# Patient Record
Sex: FEMALE | Race: BLACK OR AFRICAN AMERICAN | NOT HISPANIC OR LATINO | Employment: UNEMPLOYED | URBAN - METROPOLITAN AREA
[De-identification: names, ages, dates, MRNs, and addresses within clinical notes are randomized per-mention and may not be internally consistent; named-entity substitution may affect disease eponyms.]

---

## 2018-08-03 ENCOUNTER — PATIENT OUTREACH (OUTPATIENT)
Dept: FAMILY MEDICINE CLINIC | Facility: CLINIC | Age: 23
End: 2018-08-03

## 2018-08-03 NOTE — PROGRESS NOTES
Pt called LSW to request a resource for   Pt self reports a previous diagnosis of PTSD, depression, and anxiety  Pt states she has no current provider, and is not currently taking any psych meds  Pt denies SI/HI during this encounter  As per pt, she currently has [employer], and has been unable to find a provider  LSW educated pt that it is a difficult insurance to place for [address] in this area, but LSW will try to find her an appt  LSW called Danielle Barlow at [facility], [phone], ext  [phone]  [facility] will accept the insurance, but there will be a waiting list for this insurance  Tori to contact pt with any additional information needed and update LSW when an appt becomes available  LSW is available for additional support as needed via consult

## 2019-01-25 ENCOUNTER — TELEPHONE (OUTPATIENT)
Dept: OBGYN CLINIC | Facility: CLINIC | Age: 24
End: 2019-01-25

## 2019-01-29 ENCOUNTER — OFFICE VISIT (OUTPATIENT)
Dept: OBGYN CLINIC | Facility: CLINIC | Age: 24
End: 2019-01-29

## 2019-01-29 VITALS
HEIGHT: 65 IN | DIASTOLIC BLOOD PRESSURE: 60 MMHG | BODY MASS INDEX: 26.82 KG/M2 | WEIGHT: 161 LBS | SYSTOLIC BLOOD PRESSURE: 110 MMHG

## 2019-01-29 DIAGNOSIS — Z01.419 ENCOUNTER FOR GYNECOLOGICAL EXAMINATION (GENERAL) (ROUTINE) WITHOUT ABNORMAL FINDINGS: Primary | ICD-10-CM

## 2019-01-29 DIAGNOSIS — Z12.4 SCREENING FOR CERVICAL CANCER: ICD-10-CM

## 2019-01-29 DIAGNOSIS — Z11.3 SCREENING EXAMINATION FOR SEXUALLY TRANSMITTED DISEASE: ICD-10-CM

## 2019-01-29 DIAGNOSIS — Z30.011 ENCOUNTER FOR INITIAL PRESCRIPTION OF CONTRACEPTIVE PILLS: ICD-10-CM

## 2019-01-29 PROCEDURE — G0145 SCR C/V CYTO,THINLAYER,RESCR: HCPCS | Performed by: OBSTETRICS & GYNECOLOGY

## 2019-01-29 PROCEDURE — G0101 CA SCREEN;PELVIC/BREAST EXAM: HCPCS | Performed by: OBSTETRICS & GYNECOLOGY

## 2019-01-29 PROCEDURE — 87491 CHLMYD TRACH DNA AMP PROBE: CPT | Performed by: OBSTETRICS & GYNECOLOGY

## 2019-01-29 PROCEDURE — 87591 N.GONORRHOEAE DNA AMP PROB: CPT | Performed by: OBSTETRICS & GYNECOLOGY

## 2019-01-29 RX ORDER — LEVONORGESTREL AND ETHINYL ESTRADIOL 0.1-0.02MG
1 KIT ORAL DAILY
Qty: 28 TABLET | Refills: 11 | Status: SHIPPED | OUTPATIENT
Start: 2019-01-29 | End: 2021-05-26 | Stop reason: SDUPTHER

## 2019-01-29 NOTE — PROGRESS NOTES
Assessment/Plan:           Diagnoses and all orders for this visit:    Encounter for gynecological examination (general) (routine) without abnormal findings    Screening for cervical cancer  -     Liquid-based pap, screening; Future  -     Liquid-based pap, screening    Screening examination for sexually transmitted disease  -     Cancel: Chlamydia/GC amplified DNA by PCR; Future  -     Chlamydia/GC amplified DNA by PCR    Encounter for initial prescription of contraceptive pills  -     levonorgestrel-ethinyl estradiol (AVIANE,ALESSE,LESSINA) 0 1-20 MG-MCG per tablet; Take 1 tablet by mouth daily              Subjective:      Patient ID: Hiwot Palma is a [de-identified] P0  26 y o  female who is presenting to her obgyn office for her annual exam and pap smear  She did have a yearly exam last year, but did not have a pap smear due to hesitation about the speculum because of virginity and vaginismus  She has since become sexually active and says her only complaint today is some mild vaginal irritation when she inserts her tampon and on insertion during sexual intercourse  After insertion, she experiences no discomfort  She is not currently using any contraception for sex with her one male partner  She is currently using condoms  She is interested in starting OCPs  Her FDLMP was 1/20/19  Her periods are regular and usually last 5 days  She was on OCPs in the past for heavy and painful periods but stopped taking them about a year ago  She has no breast complaints today  She has no hx of STDs and no hx of migraines  HPI    The following portions of the patient's history were reviewed and updated as appropriate: allergies, current medications, past family history, past medical history, past social history, past surgical history and problem list     Review of Systems   Genitourinary: Positive for vaginal pain (irritation when inserting a tampon or penis )   Negative for dyspareunia, hematuria, pelvic pain, vaginal bleeding and vaginal discharge  Objective:      /60   Ht 5' 5" (1 651 m)   Wt 73 kg (161 lb)   LMP 01/17/2019 (Exact Date)   BMI 26 79 kg/m²          Physical Exam   Constitutional: She is oriented to person, place, and time  She appears well-developed and well-nourished  No distress  HENT:   Head: Normocephalic  Neck: No thyromegaly present  Cardiovascular: Normal rate, regular rhythm and normal heart sounds  No murmur heard  Pulmonary/Chest: Effort normal and breath sounds normal  No respiratory distress  She has no wheezes  She has no rales  She exhibits no tenderness  Right breast exhibits no inverted nipple, no mass, no nipple discharge, no skin change and no tenderness  Left breast exhibits no inverted nipple, no mass, no nipple discharge, no skin change and no tenderness  Abdominal: Soft  Bowel sounds are normal  She exhibits no distension and no mass  There is no tenderness  There is no rebound and no guarding  Genitourinary: Uterus normal  Rectal exam shows no external hemorrhoid  No breast swelling, tenderness, discharge or bleeding  No labial fusion  There is no rash, tenderness, lesion or injury on the right labia  There is no rash, tenderness, lesion or injury on the left labia  Uterus is not deviated, not enlarged and not tender  Cervix exhibits no motion tenderness, no discharge and no friability  Right adnexum displays no mass, no tenderness and no fullness  Left adnexum displays no mass, no tenderness and no fullness  No erythema or tenderness in the vagina  No signs of injury around the vagina  No vaginal discharge found  Musculoskeletal: She exhibits no edema  Lymphadenopathy:        Right: No inguinal adenopathy present  Left: No inguinal adenopathy present  Neurological: She is alert and oriented to person, place, and time  Skin: Skin is warm and dry  Psychiatric: She has a normal mood and affect   Her behavior is normal  Judgment and thought content normal  Nursing note and vitals reviewed

## 2019-01-30 LAB
C TRACH DNA SPEC QL NAA+PROBE: NEGATIVE
N GONORRHOEA DNA SPEC QL NAA+PROBE: NEGATIVE

## 2019-02-01 LAB
LAB AP GYN PRIMARY INTERPRETATION: NORMAL
LAB AP LMP: NORMAL
Lab: NORMAL

## 2020-01-13 ENCOUNTER — TELEPHONE (OUTPATIENT)
Dept: OBGYN CLINIC | Facility: CLINIC | Age: 25
End: 2020-01-13

## 2020-02-02 ENCOUNTER — HOSPITAL ENCOUNTER (EMERGENCY)
Facility: HOSPITAL | Age: 25
Discharge: HOME | End: 2020-02-02
Attending: EMERGENCY MEDICINE
Payer: MEDICARE

## 2020-02-02 VITALS
OXYGEN SATURATION: 100 % | DIASTOLIC BLOOD PRESSURE: 75 MMHG | HEIGHT: 65 IN | RESPIRATION RATE: 18 BRPM | TEMPERATURE: 98.2 F | SYSTOLIC BLOOD PRESSURE: 138 MMHG | BODY MASS INDEX: 25.99 KG/M2 | HEART RATE: 96 BPM | WEIGHT: 156 LBS

## 2020-02-02 DIAGNOSIS — J06.9 VIRAL URI WITH COUGH: Primary | ICD-10-CM

## 2020-02-02 PROCEDURE — 99283 EMERGENCY DEPT VISIT LOW MDM: CPT | Performed by: EMERGENCY MEDICINE

## 2020-02-02 PROCEDURE — 99282 EMERGENCY DEPT VISIT SF MDM: CPT

## 2020-02-02 NOTE — ED ATTENDING ATTESTATION
2/2/2020  I, Bev Blair MD, saw and evaluated the patient  I have discussed the patient with the resident/non-physician practitioner and agree with the resident's/non-physician practitioner's findings, Plan of Care, and MDM as documented in the resident's/non-physician practitioner's note, except where noted  All available labs and Radiology studies were reviewed  I was present for key portions of any procedure(s) performed by the resident/non-physician practitioner and I was immediately available to provide assistance  At this point I agree with the current assessment done in the Emergency Department  I have conducted an independent evaluation of this patient a history and physical is as follows:    ED Course      Emergency Department Note- Sophie Mcallister 22 y o  female MRN: [id number]    Unit/Bed#: QCB Encounter: [id number]    Sophie Mcallister is a 22 y o  female who presents with   Chief Complaint   Patient presents with    Sore Throat     sore throat, congestion, a lot of mucous and runny nose, started about 3-4 days ago         History of Present Illness   HPI:  Sophie Mcallister is a 22 y o  female who presents for evaluation of:  Sore throat, congestion, rhinorrhea for 3 days  Patient denies sick contacts at home  Review of Systems   Constitutional: Negative for chills and fever  Gastrointestinal: Negative for nausea and vomiting  Genitourinary: Negative for dysuria and flank pain  All other systems reviewed and are negative  Patient's last menstrual period was 01/26/2020  Historical Information   History reviewed  No pertinent past medical history  History reviewed  No pertinent surgical history    Social History   Social History     Substance and Sexual Activity   Alcohol Use No     Social History     Substance and Sexual Activity   Drug Use No     Social History     Tobacco Use   Smoking Status Never Smoker   Smokeless Tobacco Never Used     Family History: non-contributory    Meds/Allergies   all medications and allergies reviewed  No Known Allergies    Objective   First Vitals:   Blood Pressure: 138/75 (20)  Pulse: 96 (20)  Temperature: 98 2 °F (36 8 °C) (20)  Temp Source: Oral (20)  Respirations: 18 (20)  Height: 5' 5" (165 1 cm) (20)  Weight - Scale: 70 8 kg (156 lb) (20)  SpO2: 100 % (20)    Current Vitals:   Blood Pressure: 138/75 (20)  Pulse: 96 (20)  Temperature: 98 2 °F (36 8 °C) (20)  Temp Source: Oral (20)  Respirations: 18 (20)  Height: 5' 5" (165 1 cm) (20)  Weight - Scale: 70 8 kg (156 lb) (20)  SpO2: 100 % (20)    No intake or output data in the 24 hours ending 20    Invasive Devices     None                 Physical Exam   Constitutional: She is oriented to person, place, and time  She appears well-developed and well-nourished  HENT:   Head: Normocephalic and atraumatic  Right Ear: Hearing normal    Left Ear: Hearing normal    Mouth/Throat: Uvula is midline, oropharynx is clear and moist and mucous membranes are normal  No tonsillar exudate  Pulmonary/Chest: Effort normal and breath sounds normal    Neurological: She is alert and oriented to person, place, and time  Psychiatric: She has a normal mood and affect  Her behavior is normal    Nursing note and vitals reviewed  21 yo female presents in no distress      Medical Decision Makin  Acute viral URI    No results found for this or any previous visit (from the past 36 hour(s))  No orders to display         Portions of the record may have been created with voice recognition software  Occasional wrong word or "sound a like" substitutions may have occurred due to the inherent limitations of voice recognition software    Read the chart carefully and recognize, using context, where substitutions have occurred            Critical Care Time  Procedures

## 2020-02-02 NOTE — DISCHARGE INSTRUCTIONS
Return to the emergency department if you experience worsening of symptoms including if you are unable to swallow, drooling, if you notice a change in her voice, if you have difficulty breathing, any seizure-like activity       Recommend taking Tylenol or ibuprofen as needed for pain and fever    Recommend flonase, saline nasal rinses, and mucinex for symptom control

## 2020-02-02 NOTE — ED PROVIDER NOTES
History  Chief Complaint   Patient presents with    Sore Throat     sore throat, congestion, a lot of mucous and runny nose, started about 3-4 days ago     21 yo female presents the ED for evaluation of cough, congestion, and sore throat  Patient states she has been dealing with congestion for the past few days however today started with a sore throat  It is not of hearing with her ability to handle her secretions and she is able to tolerate p o  Intake  She denies any headache, fever, chills, neck pain, neck stiffness, nausea, vomiting, or diarrhea  No chest pain or shortness of breath  No rashes  No travel outside of the country  She denies any recent antibiotic use  Prior to Admission Medications   Prescriptions Last Dose Informant Patient Reported? Taking?   levonorgestrel-ethinyl estradiol (AVIANE,ALESSE,LESSINA) 0 1-20 MG-MCG per tablet   No No   Sig: Take 1 tablet by mouth daily      Facility-Administered Medications: None       History reviewed  No pertinent past medical history  History reviewed  No pertinent surgical history  History reviewed  No pertinent family history  I have reviewed and agree with the history as documented  Social History     Tobacco Use    Smoking status: Never Smoker    Smokeless tobacco: Never Used   Substance Use Topics    Alcohol use: No    Drug use: No        Review of Systems   Constitutional: Negative for activity change, chills, diaphoresis and fever  HENT: Positive for congestion, postnasal drip, rhinorrhea and sore throat  Negative for dental problem, drooling, ear discharge, ear pain, facial swelling, mouth sores, nosebleeds, sneezing, tinnitus, trouble swallowing and voice change  Eyes: Negative for photophobia, pain and redness  Respiratory: Negative for cough, chest tightness, shortness of breath and wheezing  Cardiovascular: Negative for chest pain, palpitations and leg swelling     Gastrointestinal: Negative for abdominal distention, abdominal pain, blood in stool, constipation, diarrhea, nausea and vomiting  Endocrine: Negative for polydipsia, polyphagia and polyuria  Genitourinary: Negative for dysuria, enuresis and flank pain  Musculoskeletal: Negative for neck pain and neck stiffness  Skin: Negative for rash  Neurological: Negative for dizziness, seizures, syncope, light-headedness and headaches  Hematological: Negative  Psychiatric/Behavioral: Negative  Physical Exam  ED Triage Vitals [02/02/20 0429]   Temperature Pulse Respirations Blood Pressure SpO2   98 2 °F (36 8 °C) 96 18 138/75 100 %      Temp Source Heart Rate Source Patient Position - Orthostatic VS BP Location FiO2 (%)   Oral Monitor Sitting Left arm --      Pain Score       No Pain             Orthostatic Vital Signs  Vitals:    02/02/20 0429   BP: 138/75   Pulse: 96   Patient Position - Orthostatic VS: Sitting       Physical Exam   Constitutional: She is oriented to person, place, and time  She appears well-developed and well-nourished  Non-toxic appearance  She does not appear ill  No distress  HENT:   Head: Normocephalic and atraumatic  Right Ear: Hearing, tympanic membrane and ear canal normal  No tenderness  Left Ear: Hearing and ear canal normal  No tenderness  Mouth/Throat: Uvula is midline, oropharynx is clear and moist and mucous membranes are normal  No oral lesions  No uvula swelling  No oropharyngeal exudate, posterior oropharyngeal edema, posterior oropharyngeal erythema or tonsillar abscesses  No tonsillar exudate  Eyes: Pupils are equal, round, and reactive to light  EOM are normal    Neck: Normal range of motion  Neck supple  No thyromegaly present  Cardiovascular: Normal rate and regular rhythm  No murmur heard  Pulmonary/Chest: Effort normal and breath sounds normal  No respiratory distress  She has no wheezes  She has no rhonchi  Abdominal: Soft  Bowel sounds are normal  She exhibits no distension   There is no tenderness  There is no rebound and no guarding  Neurological: She is alert and oriented to person, place, and time  Skin: Skin is warm and dry  Capillary refill takes less than 2 seconds  No rash noted  No erythema  Psychiatric: She has a normal mood and affect  Her behavior is normal    Nursing note and vitals reviewed  ED Medications  Medications - No data to display    Diagnostic Studies  Results Reviewed     None                 No orders to display         Procedures  Procedures      ED Course  ED Course as of Feb 02 0519   Sun Feb 02, 2020   0498 Blood Pressure: 138/75   0456 Temperature: 98 2 °F (36 8 °C)   0456 Pulse: 96   0456 Respirations: 18   0456 SpO2: 100 %                               MDM  Number of Diagnoses or Management Options  Viral URI with cough:   Diagnosis management comments: 76-year-old otherwise healthy female presents the ED for evaluation of URI symptoms including sore throat  Physical examination, patient is in no acute distress, oropharynx is clear with no exudates  Patient able to handle secretions without any difficulty  Patient will be discharged home, she was given strict return precautions  She was given information to establish care with a primary care provider  Disposition  Final diagnoses:   Viral URI with cough     Time reflects when diagnosis was documented in both MDM as applicable and the Disposition within this note     Time User Action Codes Description Comment    2/2/2020  4:58 AM Joe Allen [J06 [id number],  B97 [id number]] Viral URI with cough       ED Disposition     ED Disposition Condition Date/Time Comment    Discharge Stable Sun Feb 2, 2020  4:58 AM Eh Guerrier discharge to home/self care              Follow-up Information     Follow up With Specialties Details Why Contact Info Additional Information    [employer]  Call  To establish care with a primary care provider [phone] Emergency Department Emergency Medicine  If symptoms worsen [address] [place] [address], [address] [place] [address], [id number]   [phone]          Patient's Medications   Discharge Prescriptions    No medications on file     No discharge procedures on file  ED Provider  Attending physically available and evaluated Denise Melendrez I managed the patient along with the ED Attending      Electronically Signed by         Red Bartlett MD  02/02/20 4532

## 2021-04-08 ENCOUNTER — TELEPHONE (OUTPATIENT)
Dept: OBGYN CLINIC | Facility: CLINIC | Age: 26
End: 2021-04-08

## 2021-04-21 ENCOUNTER — ANNUAL EXAM (OUTPATIENT)
Dept: OBGYN CLINIC | Facility: CLINIC | Age: 26
End: 2021-04-21

## 2021-04-21 VITALS
BODY MASS INDEX: 24.03 KG/M2 | SYSTOLIC BLOOD PRESSURE: 117 MMHG | HEART RATE: 67 BPM | WEIGHT: 144.2 LBS | DIASTOLIC BLOOD PRESSURE: 75 MMHG | HEIGHT: 65 IN

## 2021-04-21 DIAGNOSIS — Z11.3 SCREEN FOR STD (SEXUALLY TRANSMITTED DISEASE): ICD-10-CM

## 2021-04-21 DIAGNOSIS — Z01.419 ENCOUNTER FOR GYNECOLOGICAL EXAMINATION (GENERAL) (ROUTINE) WITHOUT ABNORMAL FINDINGS: Primary | ICD-10-CM

## 2021-04-21 DIAGNOSIS — N94.2 VAGINISMUS: ICD-10-CM

## 2021-04-21 DIAGNOSIS — Z30.011 ENCOUNTER FOR INITIAL PRESCRIPTION OF CONTRACEPTIVE PILLS: ICD-10-CM

## 2021-04-21 PROCEDURE — G0101 CA SCREEN;PELVIC/BREAST EXAM: HCPCS | Performed by: OBSTETRICS & GYNECOLOGY

## 2021-04-21 PROCEDURE — 87591 N.GONORRHOEAE DNA AMP PROB: CPT | Performed by: OBSTETRICS & GYNECOLOGY

## 2021-04-21 PROCEDURE — 87491 CHLMYD TRACH DNA AMP PROBE: CPT | Performed by: OBSTETRICS & GYNECOLOGY

## 2021-04-21 RX ORDER — LEVONORGESTREL AND ETHINYL ESTRADIOL 0.1-0.02MG
1 KIT ORAL DAILY
Qty: 28 TABLET | Refills: 11 | Status: CANCELLED | OUTPATIENT
Start: 2021-04-21

## 2021-04-21 NOTE — PROGRESS NOTES
Assessment/Plan:     No problem-specific Assessment & Plan notes found for this encounter  Diagnoses and all orders for this visit:    Encounter for gynecological examination (general) (routine) without abnormal findings    Screen for STD (sexually transmitted disease)  -     Chlamydia/GC amplified DNA by PCR    Vaginismus  -     Ambulatory referral to Physical Therapy; Future    Encounter for initial prescription of contraceptive pills  -     levonorgestrel-ethinyl estradiol (AVIANE,ALESSE,LESSINA) 0 1-20 MG-MCG per tablet; Take 1 tablet by mouth daily    Depression Screening Follow-up Plan: Patient's depression screening was positive with a PHQ-2 score of 0  Their PHQ-9 score was 0  Clinically patient does not have depression  No treatment is required  Subjective:      Patient ID: Mana Dawson is a 26 y o  female who presents for routine annual exam   Her last pap was 01/29/19 and was negative  She would like to restart her OCPs  She has a long hx insertional dyspareunia  She feels very tight and can have a tearing sensation  She agrees to trying pelvic PT  HPI    The following portions of the patient's history were reviewed and updated as appropriate: allergies, current medications, past family history, past medical history, past social history, past surgical history and problem list     Review of Systems      Objective:      /75   Pulse 67   Ht 5' 5" (1 651 m)   Wt 65 4 kg (144 lb 3 2 oz)   LMP 04/05/2021 (Exact Date)   BMI 24 00 kg/m²          Physical Exam  Vitals signs and nursing note reviewed  Exam conducted with a chaperone present  Constitutional:       General: She is not in acute distress  Appearance: She is well-developed  HENT:      Head: Normocephalic  Neck:      Thyroid: No thyromegaly  Cardiovascular:      Rate and Rhythm: Normal rate and regular rhythm  Heart sounds: Normal heart sounds  No murmur     Pulmonary:      Effort: Pulmonary effort is normal  No respiratory distress  Breath sounds: Normal breath sounds  No wheezing or rales  Chest:      Chest wall: No tenderness  Breasts:         Right: No swelling, bleeding, inverted nipple, mass, nipple discharge, skin change or tenderness  Left: No swelling, bleeding, inverted nipple, mass, nipple discharge, skin change or tenderness  Comments: Fibrocystic change  Abdominal:      General: Bowel sounds are normal  There is no distension  Palpations: Abdomen is soft  There is no mass  Tenderness: There is no abdominal tenderness  There is no guarding or rebound  Genitourinary:     Labia:         Right: No rash, tenderness, lesion or injury  Left: No rash, tenderness, lesion or injury  Vagina: No signs of injury and foreign body  Tenderness present  No vaginal discharge, erythema, bleeding, lesions or prolapsed vaginal walls  Cervix: No cervical motion tenderness, discharge or friability  Uterus: Normal        Adnexa:         Right: No mass, tenderness or fullness  Left: No mass, tenderness or fullness  Rectum: No external hemorrhoid  Skin:     General: Skin is warm and dry  Neurological:      Mental Status: She is alert and oriented to person, place, and time  Psychiatric:         Behavior: Behavior normal          Thought Content:  Thought content normal          Judgment: Judgment normal

## 2021-04-27 LAB
C TRACH DNA SPEC QL NAA+PROBE: NEGATIVE
N GONORRHOEA DNA SPEC QL NAA+PROBE: NEGATIVE

## 2021-05-03 ENCOUNTER — TELEPHONE (OUTPATIENT)
Dept: OBGYN CLINIC | Facility: CLINIC | Age: 26
End: 2021-05-03

## 2021-05-14 ENCOUNTER — TELEPHONE (OUTPATIENT)
Dept: OBGYN CLINIC | Facility: CLINIC | Age: 26
End: 2021-05-14

## 2021-05-14 ENCOUNTER — IMMUNIZATIONS (OUTPATIENT)
Dept: FAMILY MEDICINE CLINIC | Facility: HOSPITAL | Age: 26
End: 2021-05-14

## 2021-05-14 DIAGNOSIS — Z23 ENCOUNTER FOR IMMUNIZATION: Primary | ICD-10-CM

## 2021-05-14 PROCEDURE — 0001A SARS-COV-2 / COVID-19 MRNA VACCINE (PFIZER-BIONTECH) 30 MCG: CPT

## 2021-05-14 PROCEDURE — 91300 SARS-COV-2 / COVID-19 MRNA VACCINE (PFIZER-BIONTECH) 30 MCG: CPT

## 2021-05-25 ENCOUNTER — TELEPHONE (OUTPATIENT)
Dept: OBGYN CLINIC | Facility: CLINIC | Age: 26
End: 2021-05-25

## 2021-05-26 ENCOUNTER — OFFICE VISIT (OUTPATIENT)
Dept: OBGYN CLINIC | Facility: CLINIC | Age: 26
End: 2021-05-26

## 2021-05-26 VITALS
HEART RATE: 89 BPM | HEIGHT: 65 IN | SYSTOLIC BLOOD PRESSURE: 130 MMHG | BODY MASS INDEX: 23.99 KG/M2 | DIASTOLIC BLOOD PRESSURE: 85 MMHG | WEIGHT: 144 LBS

## 2021-05-26 DIAGNOSIS — Z30.011 ENCOUNTER FOR INITIAL PRESCRIPTION OF CONTRACEPTIVE PILLS: Primary | ICD-10-CM

## 2021-05-26 LAB — SL AMB POCT URINE HCG: NEGATIVE

## 2021-05-26 PROCEDURE — 99213 OFFICE O/P EST LOW 20 MIN: CPT | Performed by: OBSTETRICS & GYNECOLOGY

## 2021-05-26 PROCEDURE — 81025 URINE PREGNANCY TEST: CPT | Performed by: OBSTETRICS & GYNECOLOGY

## 2021-05-26 RX ORDER — LEVONORGESTREL AND ETHINYL ESTRADIOL 0.1-0.02MG
1 KIT ORAL DAILY
Qty: 28 TABLET | Refills: 11 | Status: SHIPPED | OUTPATIENT
Start: 2021-05-26 | End: 2022-04-25 | Stop reason: SDUPTHER

## 2021-05-26 NOTE — ASSESSMENT & PLAN NOTE
Negative Urine Pregnancy test in office today  OCP prescribed today  Return in 3 months for follow up

## 2021-05-26 NOTE — PROGRESS NOTES
Assessment/Plan:    Encounter for initial prescription of contraceptive pills  Negative Urine Pregnancy test in office today  OCP prescribed today  Return in 3 months for follow up  Diagnoses and all orders for this visit:    Encounter for initial prescription of contraceptive pills  -     POCT urine HCG  -     levonorgestrel-ethinyl estradiol (AVIANE,ALESSE,LESSINA) 0 1-20 MG-MCG per tablet; Take 1 tablet by mouth daily          Subjective:      Patient ID: Bhanu Hernandez is a 26 y o  female  Bhanu Hernandez is a 26 y o  here for OCP presciption  She was recently seen for an annual well woman visit but at that time, was not prescribed OCPs due to lack of pharmacy  She is a G0 and sexually active with one male partner for the past four years  She has insertional vaginismus for which she was referred to pelvic PT during her last visit  The following portions of the patient's history were reviewed and updated as appropriate: allergies, current medications, past family history, past medical history, past social history, past surgical history and problem list     Review of Systems   Constitutional: Negative  HENT: Negative  Respiratory: Negative  Cardiovascular: Negative  Gastrointestinal: Negative  Endocrine: Negative  Genitourinary: Positive for dyspareunia  Vaginismus   Musculoskeletal: Negative for back pain, neck pain and neck stiffness  Skin: Negative  Allergic/Immunologic: Negative  Neurological: Negative  Hematological: Negative for adenopathy  Psychiatric/Behavioral: Negative  Objective:      /85   Pulse 89   Ht 5' 5" (1 651 m)   Wt 65 3 kg (144 lb)   LMP 05/13/2021 (Exact Date)   BMI 23 96 kg/m²          Physical Exam  Constitutional:       General: She is not in acute distress  Appearance: She is well-developed  She is not diaphoretic  HENT:      Head: Normocephalic and atraumatic        Nose: Nose normal  Mouth/Throat:      Pharynx: No oropharyngeal exudate  Eyes:      General: No scleral icterus  Right eye: No discharge  Left eye: No discharge  Conjunctiva/sclera: Conjunctivae normal       Pupils: Pupils are equal, round, and reactive to light  Neck:      Musculoskeletal: Normal range of motion and neck supple  Thyroid: No thyromegaly  Trachea: No tracheal deviation  Cardiovascular:      Rate and Rhythm: Normal rate and regular rhythm  Heart sounds: Normal heart sounds  No murmur  No friction rub  No gallop  Pulmonary:      Effort: Pulmonary effort is normal  No respiratory distress  Breath sounds: Normal breath sounds  No wheezing or rales  Chest:      Chest wall: No tenderness  Abdominal:      General: Bowel sounds are normal  There is no distension  Palpations: Abdomen is soft  There is no mass  Tenderness: There is no abdominal tenderness  There is no guarding or rebound  Musculoskeletal: Normal range of motion  General: No tenderness or deformity  Lymphadenopathy:      Cervical: No cervical adenopathy  Skin:     General: Skin is warm and dry  Coloration: Skin is not pale  Findings: No erythema or rash  Neurological:      Mental Status: She is alert and oriented to person, place, and time  Cranial Nerves: No cranial nerve deficit  Coordination: Coordination normal    Psychiatric:         Behavior: Behavior normal          Thought Content:  Thought content normal

## 2021-06-12 ENCOUNTER — IMMUNIZATIONS (OUTPATIENT)
Dept: FAMILY MEDICINE CLINIC | Facility: HOSPITAL | Age: 26
End: 2021-06-12

## 2021-06-12 DIAGNOSIS — Z23 ENCOUNTER FOR IMMUNIZATION: Primary | ICD-10-CM

## 2021-06-12 PROCEDURE — 91300 SARS-COV-2 / COVID-19 MRNA VACCINE (PFIZER-BIONTECH) 30 MCG: CPT

## 2021-06-12 PROCEDURE — 0002A SARS-COV-2 / COVID-19 MRNA VACCINE (PFIZER-BIONTECH) 30 MCG: CPT

## 2021-06-28 ENCOUNTER — PATIENT MESSAGE (OUTPATIENT)
Dept: OBGYN CLINIC | Facility: CLINIC | Age: 26
End: 2021-06-28

## 2021-06-28 NOTE — TELEPHONE ENCOUNTER
Can you call this pt and triage the amount of bleeding she is having? If light stick it out if heavy we should bring her in to discuss options

## 2021-07-07 ENCOUNTER — TELEPHONE (OUTPATIENT)
Dept: OBGYN CLINIC | Facility: CLINIC | Age: 26
End: 2021-07-07

## 2021-07-07 NOTE — TELEPHONE ENCOUNTER
----- Message from Tricia Ryder MD sent at 6/28/2021  3:47 PM EDT -----  Regarding: FW: Prescription Question  Contact: [phone]  Can you call this pt and triage the amount of bleeding she is having? If light stick it out if heavy we should bring her in to discuss options      ----- Message -----  From: Ramon Wright RN  Sent: 6/28/2021   7:21 AM EDT  To: Tricia Ryder MD  Subject: FW: Prescription Question                          ----- Message -----  From: Karol Woo  Sent: 6/28/2021   1:15 AM EDT  To: [facility]  Subject: Prescription Question                            Hello, I started my birth control prescription on the 6th of this month  And ever since then I've been bleeding non stop for a whole month  I know that sometimes when u take birth control your body needs to adjust but I'm not sure if my periods should be lasting this long? What should I do?

## 2022-04-25 ENCOUNTER — ANNUAL EXAM (OUTPATIENT)
Dept: OBGYN CLINIC | Facility: CLINIC | Age: 27
End: 2022-04-25

## 2022-04-25 VITALS
WEIGHT: 148.5 LBS | HEART RATE: 83 BPM | SYSTOLIC BLOOD PRESSURE: 129 MMHG | DIASTOLIC BLOOD PRESSURE: 90 MMHG | BODY MASS INDEX: 24.71 KG/M2

## 2022-04-25 DIAGNOSIS — N94.10 DYSPAREUNIA, FEMALE: ICD-10-CM

## 2022-04-25 DIAGNOSIS — Z01.419 ENCOUNTER FOR GYNECOLOGICAL EXAMINATION (GENERAL) (ROUTINE) WITHOUT ABNORMAL FINDINGS: Primary | ICD-10-CM

## 2022-04-25 DIAGNOSIS — Z12.4 CERVICAL CANCER SCREENING: ICD-10-CM

## 2022-04-25 DIAGNOSIS — Z59.9 FINANCIAL DIFFICULTIES: ICD-10-CM

## 2022-04-25 DIAGNOSIS — Z30.41 ENCOUNTER FOR SURVEILLANCE OF CONTRACEPTIVE PILLS: ICD-10-CM

## 2022-04-25 PROCEDURE — 0503F POSTPARTUM CARE VISIT: CPT | Performed by: OBSTETRICS & GYNECOLOGY

## 2022-04-25 PROCEDURE — 99395 PREV VISIT EST AGE 18-39: CPT | Performed by: OBSTETRICS & GYNECOLOGY

## 2022-04-25 PROCEDURE — G0145 SCR C/V CYTO,THINLAYER,RESCR: HCPCS | Performed by: OBSTETRICS & GYNECOLOGY

## 2022-04-25 RX ORDER — LEVONORGESTREL AND ETHINYL ESTRADIOL 0.1-0.02MG
1 KIT ORAL DAILY
Qty: 28 TABLET | Refills: 11 | Status: SHIPPED | OUTPATIENT
Start: 2022-04-25

## 2022-04-25 SDOH — ECONOMIC STABILITY - INCOME SECURITY: PROBLEM RELATED TO HOUSING AND ECONOMIC CIRCUMSTANCES, UNSPECIFIED: Z59.9

## 2022-04-25 NOTE — PROGRESS NOTES
Assessment/Plan:     No problem-specific Assessment & Plan notes found for this encounter  Diagnoses and all orders for this visit:    Encounter for gynecological examination (general) (routine) without abnormal findings    Cervical cancer screening  -     Liquid-based pap, screening    Encounter for surveillance of contraceptive pills  -     levonorgestrel-ethinyl estradiol (AVIANE,ALESSE,LESSINA) 0 1-20 MG-MCG per tablet; Take 1 tablet by mouth daily    Dyspareunia, female  -     Cancel: US pelvis complete w transvaginal; Future    Financial difficulties  -     Ambulatory Referral to Social Work Care Management Program; Future    Other orders  -     Cancel: Chlamydia/GC amplified DNA by PCR      Depression Screening Follow-up Plan: Patient's depression screening was positive with a PHQ-2 score of 0  Their PHQ-9 score was 3  Clinically patient does not have depression  No treatment is required  Subjective:      Patient ID: Suresh Avila is a 23 y o  female who presents for annual exam   Her last pap was 01/29/19 and was negative  Pap indicated today  She has been having persistent issues dyspareunia  She has vaginismus and is considering using vaginal dilators  I have offered pelvic PT for this indication  She will consider  She declines STD testing  She is engaged  HPI    The following portions of the patient's history were reviewed and updated as appropriate: allergies, current medications, past family history, past medical history, past social history, past surgical history and problem list     Review of Systems      Objective:      /90   Pulse 83   Wt 67 4 kg (148 lb 8 oz)   LMP 04/03/2022 (Exact Date)   BMI 24 71 kg/m²          Physical Exam  Vitals and nursing note reviewed  Exam conducted with a chaperone present  Constitutional:       General: She is not in acute distress  Appearance: She is well-developed  HENT:      Head: Normocephalic     Neck: Thyroid: No thyromegaly  Cardiovascular:      Rate and Rhythm: Normal rate and regular rhythm  Heart sounds: Normal heart sounds  No murmur heard  Pulmonary:      Effort: Pulmonary effort is normal  No respiratory distress  Breath sounds: Normal breath sounds  No wheezing or rales  Chest:      Chest wall: No tenderness  Breasts:      Right: No swelling, bleeding, inverted nipple, mass, nipple discharge, skin change or tenderness  Left: No swelling, bleeding, inverted nipple, mass, nipple discharge, skin change or tenderness  Abdominal:      General: Bowel sounds are normal  There is no distension  Palpations: Abdomen is soft  There is no mass  Tenderness: There is no abdominal tenderness  There is no guarding or rebound  Genitourinary:     Labia:         Right: No rash, tenderness, lesion or injury  Left: No rash, tenderness, lesion or injury  Vagina: Normal       Cervix: No cervical motion tenderness, discharge or friability  Uterus: Normal        Adnexa:         Right: No mass, tenderness or fullness  Left: No mass, tenderness or fullness  Rectum: No external hemorrhoid  Comments:  Tight musculature  Skin:     General: Skin is warm and dry  Neurological:      Mental Status: She is alert and oriented to person, place, and time  Psychiatric:         Behavior: Behavior normal          Thought Content:  Thought content normal          Judgment: Judgment normal

## 2022-04-28 LAB
LAB AP GYN PRIMARY INTERPRETATION: NORMAL
LAB AP LMP: NORMAL
Lab: NORMAL

## 2022-04-29 ENCOUNTER — PATIENT OUTREACH (OUTPATIENT)
Dept: OBGYN CLINIC | Facility: CLINIC | Age: 27
End: 2022-04-29

## 2022-04-29 NOTE — PROGRESS NOTES
[employer]NA received a new referral in regard to pt experiencing financial difficulties  [employer] contacted pt and introduced self and role  Pt expressed that she is currently employed at [employer] and is very happy because her financial situation is becoming more stable  Pts original concern was not "struggling " financially, but health insurance  Pt now pays for health insurance through her job and she will have her current insurance for at least a year  That is what she wanted to discuss with a   Now that she has insurance, pt reports no immediate needs  She states that is she has any future needs or concerns, she will contact IRENA  Pt was appreciative of the phone call

## 2023-03-01 ENCOUNTER — HOSPITAL ENCOUNTER (EMERGENCY)
Facility: HOSPITAL | Age: 28
Discharge: HOME | End: 2023-03-01
Attending: EMERGENCY MEDICINE | Admitting: EMERGENCY MEDICINE

## 2023-03-01 ENCOUNTER — APPOINTMENT (EMERGENCY)
Dept: RADIOLOGY | Facility: HOSPITAL | Age: 28
End: 2023-03-01

## 2023-03-01 VITALS
TEMPERATURE: 98.8 F | DIASTOLIC BLOOD PRESSURE: 84 MMHG | HEART RATE: 80 BPM | RESPIRATION RATE: 18 BRPM | OXYGEN SATURATION: 100 % | SYSTOLIC BLOOD PRESSURE: 167 MMHG

## 2023-03-01 DIAGNOSIS — Z34.90 PREGNANCY: ICD-10-CM

## 2023-03-01 DIAGNOSIS — R10.2 PELVIC CRAMPING: Primary | ICD-10-CM

## 2023-03-01 DIAGNOSIS — R82.71 BACTERIA IN URINE: ICD-10-CM

## 2023-03-01 LAB
AMORPH URATE CRY URNS QL MICRO: ABNORMAL
BACTERIA UR QL AUTO: ABNORMAL /HPF
BILIRUB UR QL STRIP: NEGATIVE
BUDDING YEAST: PRESENT
CLARITY UR: ABNORMAL
COLOR UR: YELLOW
EXT PREGNANCY TEST URINE: POSITIVE
EXT. CONTROL: ABNORMAL
GLUCOSE UR STRIP-MCNC: NEGATIVE MG/DL
HGB UR QL STRIP.AUTO: ABNORMAL
KETONES UR STRIP-MCNC: NEGATIVE MG/DL
LEUKOCYTE ESTERASE UR QL STRIP: ABNORMAL
NITRITE UR QL STRIP: NEGATIVE
NON-SQ EPI CELLS URNS QL MICRO: ABNORMAL /HPF
PH UR STRIP.AUTO: 7 [PH] (ref 4.5–8)
PROT UR STRIP-MCNC: ABNORMAL MG/DL
RBC #/AREA URNS AUTO: ABNORMAL /HPF
SP GR UR STRIP.AUTO: >=1.03 (ref 1–1.03)
UROBILINOGEN UR QL STRIP.AUTO: 0.2 E.U./DL
WBC #/AREA URNS AUTO: ABNORMAL /HPF

## 2023-03-01 RX ORDER — CEPHALEXIN 500 MG/1
500 CAPSULE ORAL EVERY 6 HOURS SCHEDULED
Qty: 20 CAPSULE | Refills: 0 | Status: SHIPPED | OUTPATIENT
Start: 2023-03-01 | End: 2023-03-06

## 2023-03-01 RX ORDER — PNV NO.95/FERROUS FUM/FOLIC AC 28MG-0.8MG
1 TABLET ORAL DAILY
Qty: 30 TABLET | Refills: 0 | Status: SHIPPED | OUTPATIENT
Start: 2023-03-01 | End: 2023-03-31

## 2023-03-01 RX ORDER — CEPHALEXIN 500 MG/1
500 CAPSULE ORAL ONCE
Status: COMPLETED | OUTPATIENT
Start: 2023-03-01 | End: 2023-03-01

## 2023-03-01 RX ADMIN — CEPHALEXIN 500 MG: 500 CAPSULE ORAL at 22:01

## 2023-03-02 NOTE — ED PROVIDER NOTES
History  Chief Complaint   Patient presents with   • Abdominal Cramping     Pt reports abdominal cramping that has been off and on for about 4 days  Denies any other symptoms      Ayaan Garduno is a 67-year-old woman with no significant medical history presenting with bilateral pelvic pain and cramping over the last few days  She states that it is feels similar to her sterile cycle, however does not have any significant vaginal bleeding right now  She describes it as cramping and bloating  She has not taken anything for it  Her LMP was in the beginning of January  He had some very mild vaginal spotting today  She is sexually active with her fiancé  She does not believe that he has any other partners, she does not have any other partners  No dysuria, hematuria, urinary urgency or frequency, vaginal discharge, fevers, chills, diarrhea, melena, blood in stool, prior intra-abdominal surgeries  Prior to Admission Medications   Prescriptions Last Dose Informant Patient Reported? Taking?   levonorgestrel-ethinyl estradiol (AVIANE,ALESSE,LESSINA) 0 1-20 MG-MCG per tablet   No No   Sig: Take 1 tablet by mouth daily      Facility-Administered Medications: None       History reviewed  No pertinent past medical history  History reviewed  No pertinent surgical history  Family History   Problem Relation Age of Onset   • Diabetes Mother      I have reviewed and agree with the history as documented  E-Cigarette/Vaping     E-Cigarette/Vaping Substances     Social History     Tobacco Use   • Smoking status: Never   • Smokeless tobacco: Never   Substance Use Topics   • Alcohol use: No   • Drug use: No        Review of Systems   All other systems reviewed and are negative        Physical Exam  ED Triage Vitals [03/01/23 1949]   Temperature Pulse Respirations Blood Pressure SpO2   98 8 °F (37 1 °C) 80 18 167/84 100 %      Temp Source Heart Rate Source Patient Position - Orthostatic VS BP Location FiO2 (%)   Oral Monitor Sitting Left arm --      Pain Score       4             Orthostatic Vital Signs  Vitals:    03/01/23 1949   BP: 167/84   Pulse: 80   Patient Position - Orthostatic VS: Sitting       Physical Exam  Vitals and nursing note reviewed  Constitutional:       General: She is not in acute distress  Appearance: She is well-developed  She is not ill-appearing or toxic-appearing  HENT:      Head: Normocephalic and atraumatic  Right Ear: External ear normal       Left Ear: External ear normal       Nose: Nose normal    Eyes:      Conjunctiva/sclera: Conjunctivae normal    Cardiovascular:      Rate and Rhythm: Normal rate and regular rhythm  Pulmonary:      Effort: Pulmonary effort is normal  No respiratory distress  Breath sounds: Normal breath sounds  Abdominal:      General: There is no distension  Palpations: Abdomen is soft  Tenderness: There is no right CVA tenderness, left CVA tenderness or guarding  Comments: Bilateral pelvic and suprapubic tenderness  Musculoskeletal:         General: No deformity  Cervical back: Neck supple  Skin:     General: Skin is warm and dry  Neurological:      General: No focal deficit present  Mental Status: She is alert and oriented to person, place, and time           ED Medications  Medications   cephalexin (KEFLEX) capsule 500 mg (500 mg Oral Given 3/1/23 2201)       Diagnostic Studies  Results Reviewed     Procedure Component Value Units Date/Time    Urine Microscopic [[id number]]  (Abnormal) Collected: 03/01/23 2143    Lab Status: Final result Specimen: Urine, Clean Catch Updated: 03/01/23 2158     RBC, UA None Seen /hpf      WBC, UA None Seen /hpf      Epithelial Cells Moderate /hpf      Bacteria, UA None Seen /hpf      Amorphous Crystals, UA Innumerable     Budding Yeast Present    POCT urinalysis dipstick [[id number]]  (Normal) Resulted: 03/01/23 2152    Lab Status: Final result Specimen: Urine Updated: 03/01/23 2152 Color, UA --     Clarity, UA --     EXT Leukocytes, UA --     Nitrite, UA --     Protein, UA -- mg/dl      Glucose, UA --     Ketones, UA -- mg/dl      EXT Urobilinogen, UA --      Bilirubin, UA --     Blood, UA --    POCT pregnancy, urine [[id number]]  (Abnormal) Resulted: 03/01/23 2150    Lab Status: Final result Updated: 03/01/23 2150     EXT Preg Test, Ur Positive     Control Valid    Urine Macroscopic, POC [[id number]]  (Abnormal) Collected: 03/01/23 2143    Lab Status: Final result Specimen: Urine Updated: 03/01/23 2145     Color, UA Yellow     Clarity, UA Cloudy     pH, UA 7 0     Leukocytes, UA Trace     Nitrite, UA Negative     Protein, UA Trace mg/dl      Glucose, UA Negative mg/dl      Ketones, UA Negative mg/dl      Urobilinogen, UA 0 2 E U /dl      Bilirubin, UA Negative     Occult Blood, UA Trace     Specific Gravity, UA >=1 030    Narrative:      CLINITEK RESULT                 US OB < 14 weeks with transvaginal   Final Result by Brittany Hadrin MD (03/01 2337)      1  Single live intrauterine gestation at 7 weeks 4 days by crown-rump length   2  JUMA of 10/14/2023      Workstation performed: [id number]               Procedures  Procedures      ED Course  ED Course as of 03/01/23 2340   Wed Mar 01, 2023   2149 Leukocytes, UA(!): Trace                             SBIRT 20yo+    Flowsheet Row Most Recent Value   SBIRT (23 yo +)    In order to provide better care to our patients, we are screening all of our patients for alcohol and drug use  Would it be okay to ask you these screening questions? No Filed at: 03/01/2023 2130                Medical Decision Making  36-year-old woman presenting with pelvic cramping and vaginal spotting  Concerning for UTI, pregnancy  Will first evaluate with UA, urine preg  Pregnancy positive  Bedside US performed shows no definitive intrauterine pregnancy  Will obtain formal US to determine location of pregnancy  Will treat for asymptomatic bacteruria with Keflex  US shows intrauterine pregnancy  Follow up with OB/GYN  Referred to [facility] [address]'s OB/GYN  Prescribed prenatal vitamins  Discharged home in stable condition, return precautions given  Bacteria in urine: complicated acute illness or injury  Pelvic cramping: complicated acute illness or injury  Pregnancy: complicated acute illness or injury  Amount and/or Complexity of Data Reviewed  Labs: ordered  Decision-making details documented in ED Course  Radiology: ordered  Risk  OTC drugs  Prescription drug management  Disposition  Final diagnoses:   Pelvic cramping   Pregnancy   Bacteria in urine     Time reflects when diagnosis was documented in both MDM as applicable and the Disposition within this note     Time User Action Codes Description Comment    3/1/2023 11:03 PM Racheal Roberto Add [R10 2] Pelvic cramping     3/1/2023 11:03 PM Rondall Sports [B61 47] Pregnancy     3/1/2023 11:05 PM Rondall Sports [R82 71] Bacteria in urine       ED Disposition     ED Disposition   Discharge    Condition   Stable    Date/Time   Wed Mar 1, 2023 11:02 PM    Cayden Medrano discharge to home/self care                 Follow-up Information     Follow up With Specialties Details Why Contact Info Additional Information    Ob Gyn [facility] Obstetrics and Gynecology   [address] [facility] [address] [phone]  [address] [place] [address], [facility] [address], [address] [place] [address] 91 Emergency Department Emergency Medicine  If symptoms worsen [address] [phone]  [address] [place] [address] 64 [facility], [address], [address]          Discharge Medication List as of 3/1/2023 11:06 PM      START taking these medications    Details   cephalexin (KEFLEX) 500 mg capsule Take 1 capsule (500 mg total) by mouth every 6 (six) hours for 5 days, Starting Wed 3/1/2023, Until Mon 3/6/2023, Normal      Prenatal Vit-Fe Fumarate-FA (Prenatal Vitamin) 27-0 8 MG TABS Take 1 tablet by mouth in the morning, Starting Wed 3/1/2023, Until Fri 3/31/2023, Normal         CONTINUE these medications which have NOT CHANGED    Details   levonorgestrel-ethinyl estradiol (AVIANE,ALESSE,LESSINA) 0 1-20 MG-MCG per tablet Take 1 tablet by mouth daily, Starting Mon 4/25/2022, Normal               PDMP Review     None           ED Provider  Attending physically available and evaluated [facility] [address] [address]  I managed the patient along with the ED Attending      Electronically Signed by         Sumit Dawson MD  03/01/23 0072

## 2023-03-02 NOTE — DISCHARGE INSTRUCTIONS
Take the antibiotics for the next 5 days  Follow-up with the OB/GYN      If you have any new or worsening symptoms, please return to your nearest emergency department

## 2023-03-05 NOTE — ED ATTENDING ATTESTATION
3/1/2023  IAdrian MD, saw and evaluated the patient  I have discussed the patient with the resident/non-physician practitioner and agree with the resident's/non-physician practitioner's findings, Plan of Care, and MDM as documented in the resident's/non-physician practitioner's note, except where noted  All available labs and Radiology studies were reviewed  I was present for key portions of any procedure(s) performed by the resident/non-physician practitioner and I was immediately available to provide assistance  At this point I agree with the current assessment done in the Emergency Department  I have conducted an independent evaluation of this patient a history and physical is as follows:    ED Course     Patient presents for evaluation due to bilateral pelvic pain and cramping  Patient states that her last menstrual period was in the beginning of January  She does report some mild vaginal spotting  No additional complaints  Exam: AAOx3, NAD, S/NT/ND    DDx: UTI, pregnancy, pelvic pain    Plan: We will check urine to evaluate for pregnancy  Urine was positive  Will perform bedside ultrasound to evaluate for IUP      Critical Care Time  Procedures

## 2023-03-13 ENCOUNTER — INITIAL PRENATAL (OUTPATIENT)
Dept: OBGYN CLINIC | Facility: CLINIC | Age: 28
End: 2023-03-13

## 2023-03-13 ENCOUNTER — APPOINTMENT (OUTPATIENT)
Dept: LAB | Facility: CLINIC | Age: 28
End: 2023-03-13

## 2023-03-13 VITALS
WEIGHT: 154.2 LBS | HEIGHT: 65 IN | DIASTOLIC BLOOD PRESSURE: 83 MMHG | BODY MASS INDEX: 25.69 KG/M2 | HEART RATE: 65 BPM | SYSTOLIC BLOOD PRESSURE: 128 MMHG

## 2023-03-13 DIAGNOSIS — Z59.9 FINANCIAL DIFFICULTIES: Primary | ICD-10-CM

## 2023-03-13 DIAGNOSIS — Z34.91 PRENATAL CARE IN FIRST TRIMESTER: ICD-10-CM

## 2023-03-13 DIAGNOSIS — Z34.90 PREGNANCY: ICD-10-CM

## 2023-03-13 DIAGNOSIS — Z3A.09 9 WEEKS GESTATION OF PREGNANCY: ICD-10-CM

## 2023-03-13 LAB
ABO GROUP BLD: NORMAL
BACTERIA UR QL AUTO: ABNORMAL /HPF
BASOPHILS # BLD AUTO: 0.02 THOUSANDS/ÂΜL (ref 0–0.1)
BASOPHILS NFR BLD AUTO: 0 % (ref 0–1)
BILIRUB UR QL STRIP: NEGATIVE
BLD GP AB SCN SERPL QL: NEGATIVE
CLARITY UR: ABNORMAL
COLOR UR: ABNORMAL
EOSINOPHIL # BLD AUTO: 0.17 THOUSAND/ÂΜL (ref 0–0.61)
EOSINOPHIL NFR BLD AUTO: 3 % (ref 0–6)
ERYTHROCYTE [DISTWIDTH] IN BLOOD BY AUTOMATED COUNT: 15.4 % (ref 11.6–15.1)
GLUCOSE UR STRIP-MCNC: NEGATIVE MG/DL
HBV SURFACE AG SER QL: NORMAL
HCT VFR BLD AUTO: 37.4 % (ref 34.8–46.1)
HCV AB SER QL: NORMAL
HGB BLD-MCNC: 12.1 G/DL (ref 11.5–15.4)
HGB UR QL STRIP.AUTO: NEGATIVE
IMM GRANULOCYTES # BLD AUTO: 0.02 THOUSAND/UL (ref 0–0.2)
IMM GRANULOCYTES NFR BLD AUTO: 0 % (ref 0–2)
KETONES UR STRIP-MCNC: NEGATIVE MG/DL
LEUKOCYTE ESTERASE UR QL STRIP: ABNORMAL
LYMPHOCYTES # BLD AUTO: 1.43 THOUSANDS/ÂΜL (ref 0.6–4.47)
LYMPHOCYTES NFR BLD AUTO: 22 % (ref 14–44)
MCH RBC QN AUTO: 27.3 PG (ref 26.8–34.3)
MCHC RBC AUTO-ENTMCNC: 32.4 G/DL (ref 31.4–37.4)
MCV RBC AUTO: 84 FL (ref 82–98)
MONOCYTES # BLD AUTO: 0.6 THOUSAND/ÂΜL (ref 0.17–1.22)
MONOCYTES NFR BLD AUTO: 9 % (ref 4–12)
MUCOUS THREADS UR QL AUTO: ABNORMAL
NEUTROPHILS # BLD AUTO: 4.3 THOUSANDS/ÂΜL (ref 1.85–7.62)
NEUTS SEG NFR BLD AUTO: 66 % (ref 43–75)
NITRITE UR QL STRIP: NEGATIVE
NON-SQ EPI CELLS URNS QL MICRO: ABNORMAL /HPF
NRBC BLD AUTO-RTO: 0 /100 WBCS
PH UR STRIP.AUTO: 7.5 [PH]
PLATELET # BLD AUTO: 327 THOUSANDS/UL (ref 149–390)
PMV BLD AUTO: 10.5 FL (ref 8.9–12.7)
PROT UR STRIP-MCNC: ABNORMAL MG/DL
RBC # BLD AUTO: 4.43 MILLION/UL (ref 3.81–5.12)
RBC #/AREA URNS AUTO: ABNORMAL /HPF
RH BLD: POSITIVE
RUBV IGG SERPL IA-ACNC: 156.8 IU/ML
SP GR UR STRIP.AUTO: 1.02 (ref 1–1.03)
SPECIMEN EXPIRATION DATE: NORMAL
UROBILINOGEN UR STRIP-ACNC: <2 MG/DL
WBC # BLD AUTO: 6.54 THOUSAND/UL (ref 4.31–10.16)
WBC #/AREA URNS AUTO: ABNORMAL /HPF

## 2023-03-13 SDOH — ECONOMIC STABILITY - INCOME SECURITY: PROBLEM RELATED TO HOUSING AND ECONOMIC CIRCUMSTANCES, UNSPECIFIED: Z59.9

## 2023-03-13 NOTE — LETTER
3/13/2023      This letter is to confirm that Merna Romberg is pregnant and is under our care  Her Estimated Date of Delivery: 10/14/23  If you have any questions or concerns, please contact our office    Thank you,    DARVIN Hurley

## 2023-03-13 NOTE — LETTER
[facility] [place]FERR[place]      Date: 3/13/2023    Patient name: Kathy Cage    YOB: 1995    Estimated Date of Delivery: 10/14/23      /83 (BP Location: Left arm, Patient Position: Sitting, Cuff Size: Adult)   Pulse 65   Ht 5' 5" (1 651 m)   Wt 69 9 kg (154 lb 3 2 oz)   LMP 01/02/2023   BMI 25 66 kg/m²         Thank you,  Michelle Diaz, [address] [place] [address] [place] locations:   1  [facility] and [address]       Phone: [phone]       Fax: [phone]     2   [address] [place] [address]       Phone: [phone]       Fax: [phone]

## 2023-03-13 NOTE — PATIENT INSTRUCTIONS
WARNING SIGNS DURING PREGNANCY  Call our office at [phone] for any of the followin  Vaginal bleeding  2  Sharp abdominal pain that does not go away  3  Fever (more than 100 4 and is not relieved by Tylenol)  4  Persistent vomiting lasting greater than 24 hours  5  Chest pain   6  Pain or burning when you urinate  7  Severe headache that doesn't resolve with Tylenol  8  Blurred vision or seeing spots in your vision  9  Sudden swelling of your face or hands  10  Redness, swelling or pain in a leg  11  A sudden weight gain in just a few days  12  Decrease in your baby's movement (after 28 weeks or the 6th month of pregnancy)  13  A loss of watery fluid from your vagina - can be a gush, a trickle or continuous wetness  14   After 20 weeks of pregnancy, rhythmic cramping (greater than 4 per hour) or menstrual like low/pelvic pain

## 2023-03-13 NOTE — PROGRESS NOTES
OBSTETRIC INTAKE VISIT    Lesia uLdwig presents today for initial OB visit and intake at 9w2d  LMP - 23  History obtained from patient and she reports it as follows:    History reviewed  No pertinent past medical history  Past Surgical History:   Procedure Laterality Date   • WISDOM TOOTH EXTRACTION       OB History    Para Term  AB Living   1 0 0 0 0 0   SAB IAB Ectopic Multiple Live Births   0 0 0 0 0      # Outcome Date GA Lbr Issac/2nd Weight Sex Delivery Anes PTL Lv   1 Current              Social History     Tobacco Use   • Smoking status: Never   • Smokeless tobacco: Never   Vaping Use   • Vaping Use: Never used   Substance Use Topics   • Alcohol use: Not Currently   • Drug use: Never       Current Outpatient Medications   Medication Instructions   • levonorgestrel-ethinyl estradiol (AVIANE,ALESSE,LESSINA) 0 1-20 MG-MCG per tablet 1 tablet, Oral, Daily   • Prenatal Vit-Fe Fumarate-FA (Prenatal Vitamin) 27-0 8 MG TABS 1 tablet, Oral, Daily       No Known Allergies    Vitals: /83 (BP Location: Left arm, Patient Position: Sitting, Cuff Size: Adult)   Pulse 65   Ht 5' 5" (1 651 m)   Wt 69 9 kg (154 lb 3 2 oz)   LMP 2023   BMI 25 66 kg/m²     Review of Systems:  Denies vaginal bleeding or leaking fluid  Denies abdominal/pelvic pain or contractions  Plan:  1  OB labwork ordered today to include Prenatal Panel, HCV antibody, Hgb fractionation cascade, Prenatal Carrier Screen Panel  Other labs include Glucose 1H PG  Advised patient to have drawn within the next few days  2  Will help pt schedule ultrasound with MFM  3  Return 3/21/23 for H&P prenatal visit  4  Referrals placed for [facility], , and [facility]  5  Given vaccines: Pt declines Flu vaccine  6  Patient's depression screening was assessed with a PHQ-2 score of 0  Their PHQ-9 score was 0  Clinically patient does not have depression  No treatment is required     will see pt at her OB H&P   7  Reviewed the following educational topics with patient:   -routine prenatal visit/ultrasound/labwork schedule   -hospital for delivery and office phone/answering service contact information   -nutritional demands of pregnancy, healthy dietary habits   -listeria, toxoplasmosis, seafood precautions   -weight gain expectations (based on pre-pregnant BMI)   -exercise, rest, and sexual activity during pregnancy   -abstinence from alcohol, tobacco, and illegal drugs   -common discomforts of pregnancy and appropriate management   -OTC medications safe to use in pregnancy   -genetic screening options   -vaccines in pregnancy   -symptoms to report to OB provider    -signs of PTL and pre-eclampsia    -vaginal bleeding/leaking of fluid    -severe n/v-unable to tolerate ANY food/fluids for more than 24 hours

## 2023-03-14 LAB
BACTERIA UR CULT: NORMAL
HIV 1+2 AB+HIV1 P24 AG SERPL QL IA: NORMAL
HIV 2 AB SERPL QL IA: NORMAL
HIV1 AB SERPL QL IA: NORMAL
HIV1 P24 AG SERPL QL IA: NORMAL
TREPONEMA PALLIDUM IGG+IGM AB [PRESENCE] IN SERUM OR PLASMA BY IMMUNOASSAY: NORMAL

## 2023-03-16 ENCOUNTER — APPOINTMENT (OUTPATIENT)
Dept: LAB | Facility: CLINIC | Age: 28
End: 2023-03-16

## 2023-03-16 DIAGNOSIS — Z3A.09 9 WEEKS GESTATION OF PREGNANCY: ICD-10-CM

## 2023-03-16 DIAGNOSIS — Z34.91 PRENATAL CARE IN FIRST TRIMESTER: ICD-10-CM

## 2023-03-16 LAB
GLUCOSE 1H P 50 G GLC PO SERPL-MCNC: 75 MG/DL (ref 40–134)
HGB A MFR BLD: 2.4 % (ref 1.8–3.2)
HGB A MFR BLD: 97.6 % (ref 96.4–98.8)
HGB F MFR BLD: 0 % (ref 0–2)
HGB FRACT BLD-IMP: NORMAL
HGB S MFR BLD: 0 %

## 2023-03-20 LAB
COMMENTX: (FRAGILE X): NORMAL
FMR1 GENE CGG RPT BLD/T QL: NORMAL

## 2023-03-21 ENCOUNTER — INITIAL PRENATAL (OUTPATIENT)
Dept: OBGYN CLINIC | Facility: CLINIC | Age: 28
End: 2023-03-21

## 2023-03-21 VITALS
HEART RATE: 92 BPM | BODY MASS INDEX: 25.79 KG/M2 | SYSTOLIC BLOOD PRESSURE: 138 MMHG | DIASTOLIC BLOOD PRESSURE: 77 MMHG | HEIGHT: 65 IN | WEIGHT: 154.8 LBS

## 2023-03-21 DIAGNOSIS — Z34.91 PRENATAL CARE IN FIRST TRIMESTER: Primary | ICD-10-CM

## 2023-03-21 DIAGNOSIS — Z3A.10 10 WEEKS GESTATION OF PREGNANCY: ICD-10-CM

## 2023-03-21 NOTE — PROGRESS NOTES
OB/GYN  PRENATAL H&P VISIT  Bhanu Hernandez  3/21/2023  10:01 AM  Dr Brian Kc MD      SUBJECTIVE  Patient is a  at [place] [address] here for initial prenatal H&P  This is an unintended but desired pregnancy  She is doing well  She works as   She currently lives in [place] with her fiance  She denies hx of STD/STI, denies a hx of TB or close contacts with persons with TB  She has never had MRSA  She denies a family history of inheritable conditions such as physical or intellectual disabilities, birth defects, blood disorders, heart or neural tube defects  She denies recent travel or travel planned in the near future  She denies use of nicotine or recreational drug use  She denies use of ETOH  She denies vaginal bleeding, cramping, leakage, abnormal discharge  OB History    Para Term  AB Living   1 0 0 0 0 0   SAB IAB Ectopic Multiple Live Births   0 0 0 0 0      # Outcome Date GA Lbr Issac/2nd Weight Sex Delivery Anes PTL Lv   1 Current                Review of Systems   Constitutional: Negative for chills and fever  Eyes: Negative for visual disturbance  Respiratory: Negative for shortness of breath  Cardiovascular: Negative for chest pain  Gastrointestinal: Negative for diarrhea, nausea and vomiting  Genitourinary: Negative for dysuria, flank pain, hematuria, vaginal bleeding and vaginal discharge  Skin: Negative for rash  Neurological: Negative for dizziness, numbness and headaches  All other systems reviewed and are negative  No past medical history on file      Past Surgical History:   Procedure Laterality Date   • WISDOM TOOTH EXTRACTION         Social History     Socioeconomic History   • Marital status: Single     Spouse name: Not on file   • Number of children: Not on file   • Years of education: Not on file   • Highest education level: Not on file   Occupational History   • Not on file   Tobacco Use   • Smoking status: Never   • Smokeless tobacco: Never   Vaping Use   • Vaping Use: Never used   Substance and Sexual Activity   • Alcohol use: Not Currently   • Drug use: Never   • Sexual activity: Yes     Partners: Male     Birth control/protection: None   Other Topics Concern   • Not on file   Social History Narrative   • Not on file     Social Determinants of Health     Financial Resource Strain: Medium Risk   • Difficulty of Paying Living Expenses: Somewhat hard   Food Insecurity: Food Insecurity Present   • Worried About Running Out of Food in the Last Year: Sometimes true   • Ran Out of Food in the Last Year: Sometimes true   Transportation Needs: No Transportation Needs   • Lack of Transportation (Medical): No   • Lack of Transportation (Non-Medical): No   Physical Activity: Not on file   Stress: Not on file   Social Connections: Not on file   Intimate Partner Violence: Not on file   Housing Stability: Low Risk    • Unable to Pay for Housing in the Last Year: No   • Number of Places Lived in the Last Year: 1   • Unstable Housing in the Last Year: No       OBJECTIVE  Vitals:    03/21/23 0931   BP: 138/77   Pulse: 92        Physical Exam  Constitutional:       General: She is not in acute distress  Appearance: Normal appearance  Genitourinary:      Vulva normal       No vaginal discharge  HENT:      Head: Normocephalic and atraumatic  Eyes:      Extraocular Movements: Extraocular movements intact  Cardiovascular:      Rate and Rhythm: Normal rate  Pulses: Normal pulses  Pulmonary:      Effort: Pulmonary effort is normal  No respiratory distress  Abdominal:      Tenderness: There is no abdominal tenderness  There is no guarding  Musculoskeletal:         General: Normal range of motion  Cervical back: Normal range of motion  Neurological:      General: No focal deficit present  Mental Status: She is alert  Mental status is at baseline  Skin:     General: Skin is warm and dry     Psychiatric: Mood and Affect: Mood normal          Behavior: Behavior normal    Vitals reviewed  Exam conducted with a chaperone present  ASSESSMENT AND PLAN    33 y o , , with /77 (BP Location: Left arm, Patient Position: Sitting, Cuff Size: Adult)   Pulse 92   Ht 5' 5" (1 651 m)   Wt 70 2 kg (154 lb 12 8 oz)   LMP 2023 , at 10w3d here for her prenatal H&P   by KRISHNA    Pregnancy: H&P completed today  PN Labs reviewed today  Labor expectations discussed with patient, including appointment schedule, nutrition, exercise, medications, sexual intercourse, and nausea/vomiting  Weight gain: Patient's BMI is 25 76  Recommended weight gain is 15-25lbs  Screening: Pap smear recently done - NILM  GC/CT collected  Sequential screening reviewed with patient - will proceed with NIPS and msAFP  Depression Screening Follow-up Plan: PHQ-9 score was 0  Clinically patient does not have depression  No treatment is required  Consents: Delivery process including potential OVD and  reviewed  Sign delivery consent form at 28 weeks  Labor: For analgesia, patient plans on an epidural     Contraception: Different methods of contraception were discussed with patient, including progesterone only oral pills, depo provera, nexplanon, mirena, and paragard  Patient would like to use POPs during postpartum phase  Follow up: RTC in 4 weeks  Precautions regarding labor, leakage, bleeding, and fetal movement reviewed      Mirela Horta MD  3/21/2023  10:01 AM

## 2023-03-22 LAB
CF COMMENT: NORMAL
CFTR MUT ANL BLD/T: NORMAL

## 2023-03-23 LAB
C TRACH DNA SPEC QL NAA+PROBE: ABNORMAL
CLINICAL INFO: NORMAL
ETHNIC BACKGROUND STATED: NORMAL
GENE MUT TESTED BLD/T: NORMAL
GENERAL COMMENTS:: NORMAL
LAB DIRECTOR NAME PROVIDER: NORMAL
N GONORRHOEA DNA SPEC QL NAA+PROBE: ABNORMAL
REASON FOR REFERRAL (NARRATIVE): NORMAL
REF LAB TEST METHOD: NORMAL
SL AMB DISCLAIMER: NORMAL
SL AMB GENETIC COUNSELOR: NORMAL
SMN1 GENE MUT ANL BLD/T: NORMAL
SPECIMEN SOURCE: NORMAL

## 2023-03-24 PROCEDURE — T1002 RN SERVICES UP TO 15 MINUTES: HCPCS

## 2023-03-30 ENCOUNTER — HOME HEALTH ADMISSION (OUTPATIENT)
Dept: HOME HEALTH SERVICES | Facility: OTHER | Age: 28
End: 2023-03-30

## 2023-04-06 ENCOUNTER — ROUTINE PRENATAL (OUTPATIENT)
Facility: HOSPITAL | Age: 28
End: 2023-04-06
Attending: OBSTETRICS & GYNECOLOGY

## 2023-04-06 VITALS
WEIGHT: 158.2 LBS | HEART RATE: 90 BPM | SYSTOLIC BLOOD PRESSURE: 136 MMHG | HEIGHT: 65 IN | BODY MASS INDEX: 26.36 KG/M2 | DIASTOLIC BLOOD PRESSURE: 78 MMHG

## 2023-04-06 DIAGNOSIS — Z3A.12 12 WEEKS GESTATION OF PREGNANCY: ICD-10-CM

## 2023-04-06 DIAGNOSIS — Z34.91 PRENATAL CARE IN FIRST TRIMESTER: ICD-10-CM

## 2023-04-06 DIAGNOSIS — Z91.89 AT RISK FOR HYPERTENSION: ICD-10-CM

## 2023-04-06 DIAGNOSIS — Z36.82 ENCOUNTER FOR NUCHAL TRANSLUCENCY TESTING: ICD-10-CM

## 2023-04-06 DIAGNOSIS — O36.80X0 ENCOUNTER TO DETERMINE FETAL VIABILITY OF PREGNANCY, SINGLE OR UNSPECIFIED FETUS: Primary | ICD-10-CM

## 2023-04-06 PROBLEM — Z30.011 ENCOUNTER FOR INITIAL PRESCRIPTION OF CONTRACEPTIVE PILLS: Status: RESOLVED | Noted: 2021-05-26 | Resolved: 2023-04-06

## 2023-04-06 RX ORDER — ASPIRIN 81 MG/1
162 TABLET ORAL DAILY
Qty: 180 TABLET | Refills: 3 | Status: SHIPPED | OUTPATIENT
Start: 2023-04-06

## 2023-04-06 NOTE — LETTER
April 6, 2023     Tania Maldonado MD  [address]    Patient: Mike Wilson   YOB: 1995   Date of Visit: 4/6/2023       Dear Dr Vivian Emery: Thank you for referring Tapan Face to me for evaluation  Below are my notes for this consultation  If you have questions, please do not hesitate to call me  I look forward to following your patient along with you  Sincerely,        Sherie Wild MD        CC: No Recipients  Sherie Wild MD  4/6/2023  3:32 PM  Sign when Signing Visit  aHrry Galdamez presents today for a genetic screening ultrasound  This is her first pregnancy  She has no significant medical or surgical history  She takes prenatal vitamins and has no known drug allergies  Her substance use history is unremarkable  Family history is significant for diabetes in the family and her mother has hypertension  A review of systems is otherwise negative  We discussed the options for genetic screening, including but not limited to first trimester screening, second trimester screening, combined first and second trimester screening, noninvasive prenatal screening (NIPS) for patients at high risk and diagnostic screening through the use of CVS and amniocentesis  We discussed the risks and benefits of each approach including the sensitivities and false positive rates as well as the difference between a screening test and a diagnostic test  At the conclusion of our discussion the patient elected noninvasive prenatal testing utilizing the [facility] Non-invasive prenatal screening (NIPS) test  The patient had this blood work drawn in the office and the results should be available approximately 7-10 days after her blood draw  Her results will be reported from [facility]  Please note, transvaginal imaging was performed to assess the fetus which was difficult to see transabdominally      Given the patient's history of nulliparity, ethnicity, and "maternal history of hypertension, I recommend initiating low dose aspirin therapy  A recent meta-analysis yielded risk reductions of 24% for preeclampsia, 20% for intrauterine growth restriction, and 14% for  birth, with an absolute risk reduction of 2-5% for preeclampsia, one to 5% for intrauterine growth restriction, and 2-4% for  birth  In this study, there was no identified risk of harm to the mother or fetus but long-term evidence was somewhat limited  Given the overall safety profile and risk-benefit analysis, I recommend 162 mg of aspirin be taken Daily and discontinued at around 36 weeks gestation or 2-3 weeks prior to planned delivery  I reviewed these recommendations with the patient and answered all of her questions to apparent satisfaction  We discussed follow-up in detail and I recommend an anatomy ultrasound be scheduled for 20 weeks gestation  Thank you very much for allowing us to participate in the care of this very nice patient  Should you have any questions, please do not hesitate to contact me  Portions of the record may have been created with voice recognition software  Occasional wrong word or \"sound a like\" substitutions may have occurred due to the inherent limitations of voice recognition software  Read the chart carefully and recognize, using context, where substitutions have occurred  Virginia Warren  2023 12:05 PM  Sign when Signing Visit  Patient chose to have [facility] Non-invasive Prenatal Screen with fetal sex  Patient given brochure and is aware [facility] will contact their insurance and coordinate coverage  Patient made aware she will need to respond to text message or e-mail from [employer] within 2 business days or testing will be run through insurance  Patient informed text message will come from area code  \"415\"  Provided [employer] # [phone] and web site : [email or web address]     \"[place] your test online\" card with " barcode and test tube ID provided to patient  Reviewed [employer]'s web site states 5-7 business days for results via their portal    [employer] message will be sent to patient when MFM receives results /provider reviews  2 vials of blood drawn from right arm by Calin Salinas NA/UC  Patient tolerated blood draw without difficulty  Specimens labeled with patient identifiers (name, date of birth, specimen collection date), order and specimen were verified with patient, packed and sent via [employer]  Copy of lab order scanned to Epic media  Maternal Fetal Medicine will have results in approximately 7-10 business days and will call patient or notify via [address]  Patient aware viewing lab result online will reveal fetal sex if ordered  Patient verbalized understanding of all instructions and no questions at this time

## 2023-04-06 NOTE — PROGRESS NOTES
"Patient chose to have [facility] Non-invasive Prenatal Screen with fetal sex  Patient given brochure and is aware [facility] will contact their insurance and coordinate coverage  Patient made aware she will need to respond to text message or e-mail from [employer] within 2 business days or testing will be run through insurance  Patient informed text message will come from area code  \"415\"  Provided [employer] # [phone] and web site : [email or web address] [email or web address]  \"[place] your test online\" card with barcode and test tube ID provided to patient  Reviewed [facility]'s web site states 5-7 business days for results via their portal    [employer] message will be sent to patient when MFM receives results /provider reviews  2 vials of blood drawn from right arm by Daniel Pena NA/UC  Patient tolerated blood draw without difficulty  Specimens labeled with patient identifiers (name, date of birth, specimen collection date), order and specimen were verified with patient, packed and sent via [employer]  Copy of lab order scanned to Epic media  Maternal Fetal Medicine will have results in approximately 7-10 business days and will call patient or notify via [address]  Patient aware viewing lab result online will reveal fetal sex if ordered  Patient verbalized understanding of all instructions and no questions at this time      "

## 2023-04-06 NOTE — PROGRESS NOTES
Harsh Gudino presents today for a genetic screening ultrasound  This is her first pregnancy  She has no significant medical or surgical history  She takes prenatal vitamins and has no known drug allergies  Her substance use history is unremarkable  Family history is significant for diabetes in the family and her mother has hypertension  A review of systems is otherwise negative  We discussed the options for genetic screening, including but not limited to first trimester screening, second trimester screening, combined first and second trimester screening, noninvasive prenatal screening (NIPS) for patients at high risk and diagnostic screening through the use of CVS and amniocentesis  We discussed the risks and benefits of each approach including the sensitivities and false positive rates as well as the difference between a screening test and a diagnostic test  At the conclusion of our discussion the patient elected noninvasive prenatal testing utilizing the [facility] Non-invasive prenatal screening (NIPS) test  The patient had this blood work drawn in the office and the results should be available approximately 7-10 days after her blood draw  [facility] [place] [facility]  Please note, transvaginal imaging was performed to assess the fetus which was difficult to see transabdominally  Given the patient's history of nulliparity, ethnicity, and maternal history of hypertension, I recommend initiating low dose aspirin therapy  A recent meta-analysis yielded risk reductions of 24% for preeclampsia, 20% for intrauterine growth restriction, and 14% for  birth, with an absolute risk reduction of 2-5% for preeclampsia, one to 5% for intrauterine growth restriction, and 2-4% for  birth  In this study, there was no identified risk of harm to the mother or fetus but long-term evidence was somewhat limited    Given the overall safety profile and risk-benefit analysis, I recommend 162 mg of "aspirin be taken Daily and discontinued at around 36 weeks gestation or 2-3 weeks prior to planned delivery  I reviewed these recommendations with the patient and answered all of her questions to apparent satisfaction  We discussed follow-up in detail and I recommend an anatomy ultrasound be scheduled for 20 weeks gestation  Thank you very much for allowing us to participate in the care of this very nice patient  Should you have any questions, please do not hesitate to contact me  Portions of the record may have been created with voice recognition software  Occasional wrong word or \"sound a like\" substitutions may have occurred due to the inherent limitations of voice recognition software  Read the chart carefully and recognize, using context, where substitutions have occurred    "

## 2023-04-06 NOTE — PROGRESS NOTES
Ultrasound Probe Disinfection    A transvaginal ultrasound was performed  Prior to use, disinfection was performed with High Level Disinfection Process ([employer])  Probe serial number A4: [id number] was used        Maudine Spring  04/06/23  11:27 AM

## 2023-04-13 PROBLEM — Z3A.12 12 WEEKS GESTATION OF PREGNANCY: Status: RESOLVED | Noted: 2023-04-06 | Resolved: 2023-04-13

## 2023-05-03 ENCOUNTER — PATIENT OUTREACH (OUTPATIENT)
Dept: OBGYN CLINIC | Facility: CLINIC | Age: 28
End: 2023-05-03
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
## 2023-05-12 PROCEDURE — T1002 RN SERVICES UP TO 15 MINUTES: HCPCS

## 2023-05-16 ENCOUNTER — ROUTINE PRENATAL (OUTPATIENT)
Dept: OBGYN CLINIC | Facility: CLINIC | Age: 28
End: 2023-05-16

## 2023-05-16 ENCOUNTER — APPOINTMENT (OUTPATIENT)
Dept: LAB | Facility: CLINIC | Age: 28
End: 2023-05-16

## 2023-05-16 VITALS
HEART RATE: 85 BPM | BODY MASS INDEX: 28.02 KG/M2 | SYSTOLIC BLOOD PRESSURE: 144 MMHG | DIASTOLIC BLOOD PRESSURE: 81 MMHG | WEIGHT: 168.4 LBS

## 2023-05-16 DIAGNOSIS — Z34.92 PRENATAL CARE IN SECOND TRIMESTER: Primary | ICD-10-CM

## 2023-05-16 DIAGNOSIS — Z3A.18 18 WEEKS GESTATION OF PREGNANCY: ICD-10-CM

## 2023-05-16 DIAGNOSIS — Z3A.10 10 WEEKS GESTATION OF PREGNANCY: ICD-10-CM

## 2023-05-16 DIAGNOSIS — Z34.91 PRENATAL CARE IN FIRST TRIMESTER: ICD-10-CM

## 2023-05-16 NOTE — PROGRESS NOTES
OB/GYN prenatal visit    S: 33 y o  Lindy Delgadillo 18w3d here for PN visit  She has no obstetric complaints, including pelvic pain, contractions, vaginal bleeding, loss of fluid, or decreased fetal movement       O:  Vitals:    23 1004   BP: 144/81   Pulse: 85       Gen: no acute distress, nonlabored breathing  Fundal Height: S=D  FHR: 150's via doppler    A/P:    Problem List        Other    18 weeks gestation of pregnancy    Overview     PN labs wnl  Genetic screening - NIPS, msAFP ordered, encouraged pt to have done  Contraception - POPs        Other Visit Diagnoses     Prenatal care in second trimester    -  Primary            Discussed  labor precautions and fetal kick counts    Return to clinic in 4 weeks        COVID 19 precautions were discussed with patient at length, reviewed symptoms, hygiene, social distancing, patient to call office  with questions/concerns      Moraima Lozano MD  2023  11:59 AM

## 2023-05-18 LAB
2ND TRIMESTER 4 SCREEN SERPL-IMP: NORMAL
AFP ADJ MOM SERPL: 0.9
AFP INTERP AMN-IMP: NORMAL
AFP INTERP SERPL-IMP: NORMAL
AFP INTERP SERPL-IMP: NORMAL
AFP SERPL-MCNC: 46.2 NG/ML
AGE AT DELIVERY: 26.9 YR
GA METHOD: NORMAL
GA: 18.4 WEEKS
IDDM PATIENT QL: NO
MULTIPLE PREGNANCY: NO
NEURAL TUBE DEFECT RISK FETUS: NORMAL %

## 2023-05-30 PROBLEM — Z3A.20 20 WEEKS GESTATION OF PREGNANCY: Status: ACTIVE | Noted: 2023-03-21

## 2023-05-31 ENCOUNTER — ROUTINE PRENATAL (OUTPATIENT)
Facility: HOSPITAL | Age: 28
End: 2023-05-31

## 2023-05-31 VITALS
BODY MASS INDEX: 29.26 KG/M2 | SYSTOLIC BLOOD PRESSURE: 126 MMHG | WEIGHT: 175.6 LBS | DIASTOLIC BLOOD PRESSURE: 72 MMHG | HEIGHT: 65 IN | HEART RATE: 85 BPM

## 2023-05-31 DIAGNOSIS — O16.2 ELEVATED BLOOD PRESSURE AFFECTING PREGNANCY IN SECOND TRIMESTER, ANTEPARTUM: ICD-10-CM

## 2023-05-31 DIAGNOSIS — Z36.3 ENCOUNTER FOR ANTENATAL SCREENING FOR MALFORMATION: ICD-10-CM

## 2023-05-31 DIAGNOSIS — Z3A.20 20 WEEKS GESTATION OF PREGNANCY: Primary | ICD-10-CM

## 2023-05-31 DIAGNOSIS — Z36.86 ENCOUNTER FOR ANTENATAL SCREENING FOR CERVICAL LENGTH: ICD-10-CM

## 2023-05-31 NOTE — LETTER
"May 31, 2023    Sandraroseline Davies, [address] [facility]  [place] [address] [facility]    Patient: Ekta Felix   YOB: 1995   Date of Visit: 2023   Gestational age Vlad Mosley of this communication: Routine, requires followup by you       Dear providers,    This patient was seen recently in our  office  The content of my evaluation today is in the ultrasound report under \"OB Procedures\" tab  Patient with mild range BP in clinic today after additional elevated at her recent OB visit with you on   I advised baseline preeclampsia labs and recommend closely monitoring with a low threshold to diagnose chronic hypertension this pregnancy to optimize surveillance  Please don't hesitate to contact our office with any concerns or questions       Sincerely,      Rox Munoz MD  Attending Physician, [place]      "

## 2023-05-31 NOTE — PROGRESS NOTES
"[address]: Aurelia Wall was seen today for anatomic survey and cervical length screening ultrasound  See ultrasound report under \"OB Procedures\" tab  The time spent on this established patient on the encounter date included 10 minutes previsit service time reviewing records and precharting, 15 minutes face-to-face service time counseling regarding results and coordinating care, and  10 minutes charting, totalling 35 minutes    Please don't hesitate to contact our office with any concerns or questions   -Quin Montana MD      "

## 2023-05-31 NOTE — PROGRESS NOTES
Ultrasound Probe Disinfection    A transvaginal ultrasound was performed  Prior to use, disinfection was performed with High Level Disinfection Process ([employer])  Probe serial number A1: [id number] was used        Jake Loving  05/31/23  9:25 AM

## 2023-05-31 NOTE — PATIENT INSTRUCTIONS
Thank you for choosing us for your  care today  If you have any questions about your ultrasound or care, please do not hesitate to contact us or your primary obstetrician  Some general instructions for your pregnancy are:    Protect against coronavirus: get vaccinated - pregnant women are increased risk of severe COVID  Notify your primary care doctor if you have any symptoms  Exercise: Aim for 22 minutes per day (150 minutes per week) of regular exercise  Walking is great! Nutrition: aim for calcium-rich and iron-rich foods as well as healthy sources of protein  Learn about Preeclampsia: preeclampsia is a common, serious high blood pressure complication in pregnancy  A blood pressure of 551QLFV (systolic or top number) or 12MNWA (diastolic or bottom number) is not normal and needs evaluation by your doctor  Aspirin is sometimes prescribed in early pregnancy to prevent preeclampsia in women with risk factors - ask your obstetrician if you should be on this medication  If you smoke, try to reduce how many cigarettes you smoke or try to quit completely  Do not vape  Other warning signs to watch out for in pregnancy or postpartum: chest pain, obstructed breathing or shortness of breath, seizures, thoughts of hurting yourself or your baby, bleeding, a painful or swollen leg, fever, or headache (see [employer] POST-BIRTH Warning Signs campaign)  If these happen call 911  Itching is also not normal in pregnancy and if you experience this, especially over your hands and feet, potentially worse at night, notify your doctors

## 2023-06-05 ENCOUNTER — APPOINTMENT (OUTPATIENT)
Dept: LAB | Facility: CLINIC | Age: 28
End: 2023-06-05
Payer: COMMERCIAL

## 2023-06-05 DIAGNOSIS — Z3A.20 20 WEEKS GESTATION OF PREGNANCY: ICD-10-CM

## 2023-06-05 DIAGNOSIS — O16.2 ELEVATED BLOOD PRESSURE AFFECTING PREGNANCY IN SECOND TRIMESTER, ANTEPARTUM: ICD-10-CM

## 2023-06-05 LAB
ALBUMIN SERPL BCP-MCNC: 2.9 G/DL (ref 3.5–5)
ALP SERPL-CCNC: 64 U/L (ref 46–116)
ALT SERPL W P-5'-P-CCNC: 24 U/L (ref 12–78)
ANION GAP SERPL CALCULATED.3IONS-SCNC: 5 MMOL/L (ref 4–13)
AST SERPL W P-5'-P-CCNC: 22 U/L (ref 5–45)
BILIRUB SERPL-MCNC: 0.2 MG/DL (ref 0.2–1)
BUN SERPL-MCNC: 5 MG/DL (ref 5–25)
CALCIUM ALBUM COR SERPL-MCNC: 9.9 MG/DL (ref 8.3–10.1)
CALCIUM SERPL-MCNC: 9 MG/DL (ref 8.3–10.1)
CHLORIDE SERPL-SCNC: 109 MMOL/L (ref 96–108)
CO2 SERPL-SCNC: 21 MMOL/L (ref 21–32)
CREAT SERPL-MCNC: 0.7 MG/DL (ref 0.6–1.3)
ERYTHROCYTE [DISTWIDTH] IN BLOOD BY AUTOMATED COUNT: 14.7 % (ref 11.6–15.1)
GFR SERPL CREATININE-BSD FRML MDRD: 119 ML/MIN/1.73SQ M
GLUCOSE P FAST SERPL-MCNC: 78 MG/DL (ref 65–99)
HCT VFR BLD AUTO: 35.5 % (ref 34.8–46.1)
HGB BLD-MCNC: 11.2 G/DL (ref 11.5–15.4)
MCH RBC QN AUTO: 27.7 PG (ref 26.8–34.3)
MCHC RBC AUTO-ENTMCNC: 31.5 G/DL (ref 31.4–37.4)
MCV RBC AUTO: 88 FL (ref 82–98)
PLATELET # BLD AUTO: 283 THOUSANDS/UL (ref 149–390)
PMV BLD AUTO: 10.6 FL (ref 8.9–12.7)
POTASSIUM SERPL-SCNC: 3.7 MMOL/L (ref 3.5–5.3)
PROT SERPL-MCNC: 6.6 G/DL (ref 6.4–8.4)
RBC # BLD AUTO: 4.05 MILLION/UL (ref 3.81–5.12)
SODIUM SERPL-SCNC: 135 MMOL/L (ref 135–147)
WBC # BLD AUTO: 8.69 THOUSAND/UL (ref 4.31–10.16)

## 2023-06-05 PROCEDURE — 36415 COLL VENOUS BLD VENIPUNCTURE: CPT

## 2023-06-05 PROCEDURE — 80053 COMPREHEN METABOLIC PANEL: CPT

## 2023-06-05 PROCEDURE — 85027 COMPLETE CBC AUTOMATED: CPT

## 2023-06-09 ENCOUNTER — APPOINTMENT (OUTPATIENT)
Dept: LAB | Facility: CLINIC | Age: 28
End: 2023-06-09

## 2023-06-09 ENCOUNTER — ROUTINE PRENATAL (OUTPATIENT)
Dept: OBGYN CLINIC | Facility: CLINIC | Age: 28
End: 2023-06-09

## 2023-06-09 VITALS
HEART RATE: 102 BPM | HEIGHT: 65 IN | BODY MASS INDEX: 29.22 KG/M2 | SYSTOLIC BLOOD PRESSURE: 152 MMHG | WEIGHT: 175.4 LBS | DIASTOLIC BLOOD PRESSURE: 76 MMHG

## 2023-06-09 DIAGNOSIS — Z3A.21 21 WEEKS GESTATION OF PREGNANCY: ICD-10-CM

## 2023-06-09 DIAGNOSIS — Z34.92 PRENATAL CARE IN SECOND TRIMESTER: Primary | ICD-10-CM

## 2023-06-09 DIAGNOSIS — O13.2 GESTATIONAL HYPERTENSION, SECOND TRIMESTER: ICD-10-CM

## 2023-06-09 PROCEDURE — 99214 OFFICE O/P EST MOD 30 MIN: CPT | Performed by: OBSTETRICS & GYNECOLOGY

## 2023-06-09 NOTE — PROGRESS NOTES
Deb Amado presents today for routine OB visit at [address] [place] [address]  Blood Pressure: 152/76  Wt=79 6 kg (175 lb 6 4 oz); Body mass index is 29 19 kg/m² ; TWG=12 4 kg (27 lb 6 4 oz)  Fetal Heart Rate: 154; Fundal Height (cm): 21 cm  Abdomen: gravid, soft, non-tender  She reports   Denies uterine contractions or persistent cramping  Denies vaginal bleeding or leaking of fluid  Reports fetal movement  Scheduled for ultrasound 6/22/23  Reviewed common discomforts of pregnancy in second trimester and warning signs  Advised to continue medications and return in 3 days to [facility] office for BP evaluation and transfer care        Current Outpatient Medications   Medication Instructions   • aspirin (ECOTRIN LOW STRENGTH) 162 mg, Oral, Daily   • Prenatal Vit-Fe Fumarate-FA (Prenatal Vitamin) 27-0 8 MG TABS 1 tablet, Oral, Daily         G1 Problems (from 03/13/23 to present)     Problem Noted Resolved    Gestational hypertension, second trimester 6/9/2023 by Cardinal Sindy DO No    Overview Signed 6/9/2023 11:58 AM by Cardinal Sindy DO     Patient has had mild elevated BP's nonpregnant         21 weeks gestation of pregnancy 3/21/2023 by Delilah Morris MD No    Overview Addendum 5/16/2023 11:58 AM by Anup Tobias MD     PN labs wnl  Genetic screening - NIPS, msAFP ordered, encouraged pt to have done  Contraception - POPs

## 2023-06-09 NOTE — PATIENT INSTRUCTIONS
WARNING SIGNS DURING PREGNANCY  Call our office at [phone] for any of the followin  Vaginal bleeding  2  Sharp abdominal pain that does not go away  3  Fever (more than 100 4 and is not relieved by Tylenol)  4  Persistent vomiting lasting greater than 24 hours  5  Chest pain   6  Pain or burning when you urinate  7  Severe headache that doesn't resolve with Tylenol  8  Blurred vision or seeing spots in your vision  9  Sudden swelling of your face or hands  10  Redness, swelling or pain in a leg  11  A sudden weight gain in just a few days  12  Decrease in your baby's movement (after 28 weeks or the 6th month of pregnancy)  13  A loss of watery fluid from your vagina - can be a gush, a trickle or continuous wetness  14   After 20 weeks of pregnancy, rhythmic cramping (greater than 4 per hour) or menstrual like low/pelvic pain

## 2023-06-11 PROBLEM — Z3A.22 22 WEEKS GESTATION OF PREGNANCY: Status: ACTIVE | Noted: 2023-03-21

## 2023-06-12 ENCOUNTER — ROUTINE PRENATAL (OUTPATIENT)
Dept: OBGYN CLINIC | Facility: CLINIC | Age: 28
End: 2023-06-12

## 2023-06-12 VITALS
DIASTOLIC BLOOD PRESSURE: 76 MMHG | HEIGHT: 65 IN | BODY MASS INDEX: 29.06 KG/M2 | SYSTOLIC BLOOD PRESSURE: 118 MMHG | HEART RATE: 92 BPM | WEIGHT: 174.4 LBS

## 2023-06-12 DIAGNOSIS — O13.2 GESTATIONAL HYPERTENSION, SECOND TRIMESTER: Primary | ICD-10-CM

## 2023-06-12 DIAGNOSIS — Z3A.22 22 WEEKS GESTATION OF PREGNANCY: ICD-10-CM

## 2023-06-12 DIAGNOSIS — Z34.90 PREGNANCY: ICD-10-CM

## 2023-06-12 PROCEDURE — 87591 N.GONORRHOEAE DNA AMP PROB: CPT | Performed by: NURSE PRACTITIONER

## 2023-06-12 PROCEDURE — 99213 OFFICE O/P EST LOW 20 MIN: CPT | Performed by: NURSE PRACTITIONER

## 2023-06-12 PROCEDURE — 87491 CHLMYD TRACH DNA AMP PROBE: CPT | Performed by: NURSE PRACTITIONER

## 2023-06-12 RX ORDER — PNV NO.95/FERROUS FUM/FOLIC AC 28MG-0.8MG
1 TABLET ORAL DAILY
Qty: 90 TABLET | Refills: 1 | Status: ON HOLD | OUTPATIENT
Start: 2023-06-12 | End: 2023-12-12

## 2023-06-12 RX ORDER — BLOOD PRESSURE TEST KIT
KIT MISCELLANEOUS DAILY
Qty: 1 KIT | Refills: 0 | Status: SHIPPED | OUTPATIENT
Start: 2023-06-12

## 2023-06-12 NOTE — PROGRESS NOTES
[address]  [facility]  OB/GYN prenatal visit    S: 33 y o  Ollie Cos 22w1d here for PN visit  She has no obstetric complaints, including pelvic pain, contractions, vaginal bleeding, loss of fluid, or decreased fetal movement  Newly dx with GHTN, UPCR- not resulted  She denies any HA's, visual changes or ROQ pain   gc and CT invalid- will repeat today  Works out at [employer], will lift 40 lbs recommended not to lift over 20 lbs- she will adjust    O:  Vitals:    23 1037   BP: 118/76   Pulse: 92       Gen: no acute distress, nonlabored breathing  Fundal Height (cm): 23 cm  Fetal Heart Rate: 138    A/P:      IUP at 22w1d  No obstetric complaints today  TWG: + 26 lbs 6 4 oz ( 25-35 lbs weight gain recommended in pregnancy) reviewed with pt  Reviewed US results and fiboids, has f/u anatomy US scheduled for 23  Discussed  labor precautions, preeclamptic precautions and fetal kick counts    BP cuff ordered, reviewed to monitor daily and call if 140/90 or symptoms  Return to clinic in 4 weeks      Problem List        Cardiovascular and Mediastinum    Gestational hypertension, second trimester    Overview     Patient has had mild elevated BP's nonpregnant  Newly diagnosed GHTN awaiting urine protein/creatnine ratio  Nml LFT's and Platelets  Growth scan at 28 wks  BP cuff to monitor at home  Other    22 weeks gestation of pregnancy    Overview     PN labs wnl  25 to 35 pounds recommended weight gain in pregnancy  Genetic screening - NIPS, msAFP -negative screen  23 US Left lateral pedunculated fibroid 3 7x3  4x4 1 cm  Growth scan at 28 wks and 3rd trimester  Contraception - POPs  Plans on bottlefeeding breast-feeding            DARVIN Kamara  2023  11:04 AM

## 2023-06-13 ENCOUNTER — TELEPHONE (OUTPATIENT)
Dept: OBGYN CLINIC | Facility: CLINIC | Age: 28
End: 2023-06-13

## 2023-06-13 LAB
C TRACH DNA SPEC QL NAA+PROBE: NEGATIVE
N GONORRHOEA DNA SPEC QL NAA+PROBE: NEGATIVE

## 2023-06-13 NOTE — TELEPHONE ENCOUNTER
----- Message from Kaykay Duong, [address]  sent at 6/13/2023 12:40 PM EDT -----  Please inform pt that her culture for chlamydia and gonorrhea were negative  Thank You!

## 2023-06-13 NOTE — TELEPHONE ENCOUNTER
Called and spoke to patient, informed her of the test results, patient verbalized understanding, no questions

## 2023-06-21 NOTE — PROGRESS NOTES
Please refer to the [facility] ultrasound report in Ob Procedures for additional information regarding today's visit

## 2023-06-22 ENCOUNTER — ULTRASOUND (OUTPATIENT)
Facility: HOSPITAL | Age: 28
End: 2023-06-22
Payer: COMMERCIAL

## 2023-06-22 VITALS
SYSTOLIC BLOOD PRESSURE: 138 MMHG | BODY MASS INDEX: 30.08 KG/M2 | DIASTOLIC BLOOD PRESSURE: 62 MMHG | HEIGHT: 65 IN | HEART RATE: 94 BPM | WEIGHT: 180.56 LBS

## 2023-06-22 DIAGNOSIS — D25.9 UTERINE FIBROID COMPLICATING ANTENATAL CARE, BABY NOT YET DELIVERED, SECOND TRIMESTER: ICD-10-CM

## 2023-06-22 DIAGNOSIS — Z3A.23 23 WEEKS GESTATION OF PREGNANCY: Primary | ICD-10-CM

## 2023-06-22 DIAGNOSIS — O34.12 UTERINE FIBROID COMPLICATING ANTENATAL CARE, BABY NOT YET DELIVERED, SECOND TRIMESTER: ICD-10-CM

## 2023-06-22 DIAGNOSIS — Z36.89 ENCOUNTER FOR FETAL ANATOMIC SURVEY: ICD-10-CM

## 2023-06-22 PROCEDURE — 76816 OB US FOLLOW-UP PER FETUS: CPT | Performed by: OBSTETRICS & GYNECOLOGY

## 2023-06-22 NOTE — LETTER
June 22, 2023     [place] PROVIDER    Patient: Kiana Londono   YOB: 1995   Date of Visit: 6/22/2023       Dear   Provider: Thank you for referring Wanda Mason to me for evaluation  Below are my notes for this consultation  If you have questions, please do not hesitate to call me  I look forward to following your patient along with you           Sincerely,        Prateek Foreman MD        CC: No Recipients    Prateek Foreman MD  6/21/2023  7:58 AM  Sign when Signing Visit  Please refer to the [facility] ultrasound report in Ob Procedures for additional information regarding today's visit

## 2023-07-09 PROBLEM — Z3A.26 26 WEEKS GESTATION OF PREGNANCY: Status: ACTIVE | Noted: 2023-03-21

## 2023-07-09 NOTE — PROGRESS NOTES
[address]  OB/GYN prenatal visit    S: 33 y.o. Soraya Apodaca 26w1d here for PN visit. She has no obstetric complaints, including pelvic pain, contractions, vaginal bleeding, loss of fluid, or decreased fetal movement. BP checks at home all under 140/90. She denies any HA's, visual changes or RUQ pain. Feels bloated after eating. O:  Vitals:    07/10/23 0851   BP: 134/87   Pulse: 75       Gen: no acute distress, nonlabored breathing  Fundal Height (cm): 26 cm  Fetal Heart Rate: 150    A/P:      IUP at 26w1d  No obstetric complaints today  TWG: + 32 lbs 12.8 oz ( 25-35 lbs recommended in pregnancy) reviewed with pt she is eating healthy, to watch carbs  To complete 28 wk lbs and UPCR  Discussed  labor precautions, preeclamptic precautions and fetal kick counts    Return to clinic in 2 weeks    Problem List        Cardiovascular and Mediastinum    Gestational hypertension, second trimester    Overview     Patient has had mild elevated BP's nonpregnant. Newly diagnosed GHTN awaiting urine protein/creatnine ratio. Nml LFT's and Platelets  Growth scan at 28 wks  BP cuff to monitor at home- all have been under 140/90            Other    26 weeks gestation of pregnancy    Overview     PN labs wnl  25 to 35 pounds recommended weight gain in pregnancy  Genetic screening - NIPS, msAFP -negative screen. 23  Left lateral pedunculated fibroid 3.7x3. 4x4.1 cm  Growth scan at 28 wks and 3rd trimester  Contraception - POPs  Plans on bottlefeeding breast-feeding            DARVIN Pina  7/10/2023  9:22 AM

## 2023-07-10 ENCOUNTER — ROUTINE PRENATAL (OUTPATIENT)
Dept: OBGYN CLINIC | Facility: CLINIC | Age: 28
End: 2023-07-10

## 2023-07-10 VITALS
WEIGHT: 180.8 LBS | HEIGHT: 65 IN | BODY MASS INDEX: 30.12 KG/M2 | SYSTOLIC BLOOD PRESSURE: 134 MMHG | HEART RATE: 75 BPM | DIASTOLIC BLOOD PRESSURE: 87 MMHG

## 2023-07-10 DIAGNOSIS — O13.2 GESTATIONAL HYPERTENSION, SECOND TRIMESTER: Primary | ICD-10-CM

## 2023-07-10 DIAGNOSIS — Z3A.26 26 WEEKS GESTATION OF PREGNANCY: ICD-10-CM

## 2023-07-10 PROCEDURE — 99213 OFFICE O/P EST LOW 20 MIN: CPT | Performed by: NURSE PRACTITIONER

## 2023-07-10 RX ORDER — PNV,CALCIUM 72/IRON/FOLIC ACID 27 MG-1 MG
1 TABLET ORAL EVERY MORNING
COMMUNITY
Start: 2023-06-12 | End: 2023-07-10 | Stop reason: SDUPTHER

## 2023-07-10 RX ORDER — BLOOD PRESSURE TEST KIT-LARGE
KIT MISCELLANEOUS
COMMUNITY
Start: 2023-06-12

## 2023-07-27 ENCOUNTER — TELEPHONE (OUTPATIENT)
Facility: HOSPITAL | Age: 28
End: 2023-07-27

## 2023-07-27 ENCOUNTER — ROUTINE PRENATAL (OUTPATIENT)
Dept: OBGYN CLINIC | Facility: CLINIC | Age: 28
End: 2023-07-27

## 2023-07-27 ENCOUNTER — ULTRASOUND (OUTPATIENT)
Facility: HOSPITAL | Age: 28
End: 2023-07-27
Payer: COMMERCIAL

## 2023-07-27 VITALS
HEIGHT: 65 IN | SYSTOLIC BLOOD PRESSURE: 130 MMHG | BODY MASS INDEX: 30.09 KG/M2 | HEART RATE: 108 BPM | DIASTOLIC BLOOD PRESSURE: 80 MMHG

## 2023-07-27 VITALS
DIASTOLIC BLOOD PRESSURE: 82 MMHG | SYSTOLIC BLOOD PRESSURE: 130 MMHG | HEIGHT: 65 IN | RESPIRATION RATE: 18 BRPM | HEART RATE: 96 BPM | BODY MASS INDEX: 30.82 KG/M2 | WEIGHT: 185 LBS

## 2023-07-27 DIAGNOSIS — O13.2 GESTATIONAL HYPERTENSION, SECOND TRIMESTER: ICD-10-CM

## 2023-07-27 DIAGNOSIS — Z36.4 ULTRASOUND FOR ANTENATAL SCREENING FOR FETAL GROWTH RESTRICTION: ICD-10-CM

## 2023-07-27 DIAGNOSIS — O13.3 GESTATIONAL HYPERTENSION, THIRD TRIMESTER: ICD-10-CM

## 2023-07-27 DIAGNOSIS — Z3A.28 28 WEEKS GESTATION OF PREGNANCY: Primary | ICD-10-CM

## 2023-07-27 DIAGNOSIS — Z3A.26 26 WEEKS GESTATION OF PREGNANCY: Primary | ICD-10-CM

## 2023-07-27 DIAGNOSIS — Z3A.28 28 WEEKS GESTATION OF PREGNANCY: ICD-10-CM

## 2023-07-27 DIAGNOSIS — Z23 NEED FOR VACCINATION: ICD-10-CM

## 2023-07-27 DIAGNOSIS — Z34.93 PRENATAL CARE, THIRD TRIMESTER: Chronic | ICD-10-CM

## 2023-07-27 PROCEDURE — 90715 TDAP VACCINE 7 YRS/> IM: CPT | Performed by: OBSTETRICS & GYNECOLOGY

## 2023-07-27 PROCEDURE — T1002 RN SERVICES UP TO 15 MINUTES: HCPCS

## 2023-07-27 PROCEDURE — 90471 IMMUNIZATION ADMIN: CPT | Performed by: OBSTETRICS & GYNECOLOGY

## 2023-07-27 PROCEDURE — 76816 OB US FOLLOW-UP PER FETUS: CPT | Performed by: OBSTETRICS & GYNECOLOGY

## 2023-07-27 PROCEDURE — 99213 OFFICE O/P EST LOW 20 MIN: CPT | Performed by: OBSTETRICS & GYNECOLOGY

## 2023-07-27 NOTE — LETTER
July 27, 2023     [address] [facility] PROVIDER    Patient: Radha Aquino   YOB: 1995   Date of Visit: 7/27/2023       Dear  Provider: Thank you for referring Cathy Park to me for evaluation. Below are my notes for this consultation. If you have questions, please do not hesitate to call me. I look forward to following your patient along with you.          Sincerely,        Cristina Alvarado MD        CC: No Recipients    Cristina Alvarado MD  7/26/2023  7:16 AM  Sign when Signing Visit  Please refer to the [facility] ultrasound report in Ob Procedures for additional information regarding today's visit

## 2023-07-27 NOTE — PROGRESS NOTES
[facility] VISIT  Name: Malka Zeng  MRN: [id number]  : 1995      ASSESSMENT/PLAN:  Problem List        Cardiovascular and Mediastinum    Gestational hypertension, second trimester    Overview     Patient has had mild elevated BP's nonpregnant  Newly diagnosed GHTN awaiting urine protein/creatnine ratio - pt reminded to complete  Nml LFT's and Platelets  BP cuff to monitor at home- continue to remain normal at home  BP wnl today            Other    Prenatal care, third trimester (Chronic)    28 weeks gestation of pregnancy    Overview     PN labs wnl  25 to 35 pounds recommended weight gain in pregnancy  Genetic screening - NIPS, msAFP -negative screen. 23 US Left lateral pedunculated fibroid 3.7x3. 4x4.1 cm  Contraception - POPs  Plans on bottlefeeding breast-feeding  S/p tdap  28 week labs - reminded to complete  Delivery consent signed               SUBJECTIVE 33 y.o.  28w5d here for PN visit. She denies contractions. She denies leakage of fluid and vaginal bleeding. She reports good fetal movement.     OBJECTIVE:  Vitals:    23 1416   BP: 130/82   Pulse: 96   Resp: 18       Physical Exam    Fundal height: 30 cm  FHT: 141 bpm       Future Appointments   Date Time Provider [address]   2023  2:00 PM  NURSE REX 3 [facility]   2023  1:30 PM [address] [place]   8/10/2023 11:30 AM Monisha Sweet, [address] [facility] [address] [facility] [address]    2023  2:30 PM  NURSE REX 2 [facility]   2023  2:30 PM  NURSE REX 2 [facility]   2023  1:00 PM  US [address] [facility] [address] [address]   2023  2:30 PM  US [address]   2023  2:00 PM  NURSE REX 3 [facility]         Lori Pete MD  OB/GYN PGY-4  2023  2:41 PM

## 2023-07-27 NOTE — LETTER
July 27, 2023     Adilia Perry MD  [place] [address]    Patient: Kia Torre   YOB: 1995   Date of Visit: 7/27/2023       Dear Dr. Alisson Harden: Thank you for referring Alejandra Walsh to me for evaluation. Below are my notes for this consultation. If you have questions, please do not hesitate to call me. I look forward to following your patient along with you.          Sincerely,        Lois Stanford MD        CC: No Recipients    Lois Stanford MD  7/26/2023  7:16 AM  Sign when Signing Visit  Please refer to the [facility] ultrasound report in Ob Procedures for additional information regarding today's visit

## 2023-07-27 NOTE — TELEPHONE ENCOUNTER
Called PT to schedule f/u appt's. PT needed twice weekly testing, only willing to go to [facility] or [facility] location, offered other locations w/availability. No availability for NST/TRACY at those locations 7/31, 8/7 and 8/14 weeks. PT verbalized understanding that we would only schedule 1 NST those weeks and call her if any cancellations.

## 2023-07-30 LAB
DME PARACHUTE DELIVERY DATE REQUESTED: NORMAL
DME PARACHUTE ITEM DESCRIPTION: NORMAL
DME PARACHUTE ORDER STATUS: NORMAL
DME PARACHUTE SUPPLIER NAME: NORMAL
DME PARACHUTE SUPPLIER PHONE: NORMAL

## 2023-07-31 ENCOUNTER — ROUTINE PRENATAL (OUTPATIENT)
Facility: HOSPITAL | Age: 28
End: 2023-07-31
Payer: COMMERCIAL

## 2023-07-31 VITALS
SYSTOLIC BLOOD PRESSURE: 124 MMHG | HEART RATE: 91 BPM | WEIGHT: 185 LBS | HEIGHT: 65 IN | BODY MASS INDEX: 30.82 KG/M2 | DIASTOLIC BLOOD PRESSURE: 68 MMHG

## 2023-07-31 DIAGNOSIS — O13.2 GESTATIONAL HYPERTENSION, SECOND TRIMESTER: Primary | ICD-10-CM

## 2023-07-31 DIAGNOSIS — Z3A.29 29 WEEKS GESTATION OF PREGNANCY: ICD-10-CM

## 2023-07-31 PROCEDURE — 59025 FETAL NON-STRESS TEST: CPT | Performed by: OBSTETRICS & GYNECOLOGY

## 2023-07-31 NOTE — PROGRESS NOTES
Non-Stress Testing:    Non-Stress test, equipment, procedure, and expected outcomes explained. Reviewed fetal kick counts and when to call OB. Verified patient understanding of fetal kick counts with teach back method. Patient reports feeling daily fetal movements. Patient has no questions or concerns. Dr. Linda Hui viewed NST strip prior to completion of visit.

## 2023-08-04 PROCEDURE — T1002 RN SERVICES UP TO 15 MINUTES: HCPCS

## 2023-08-08 ENCOUNTER — ROUTINE PRENATAL (OUTPATIENT)
Facility: HOSPITAL | Age: 28
End: 2023-08-08
Payer: COMMERCIAL

## 2023-08-08 VITALS
WEIGHT: 182.6 LBS | HEART RATE: 98 BPM | SYSTOLIC BLOOD PRESSURE: 120 MMHG | BODY MASS INDEX: 30.42 KG/M2 | HEIGHT: 65 IN | DIASTOLIC BLOOD PRESSURE: 66 MMHG

## 2023-08-08 DIAGNOSIS — Z3A.30 30 WEEKS GESTATION OF PREGNANCY: Primary | ICD-10-CM

## 2023-08-08 DIAGNOSIS — O13.2 GESTATIONAL HYPERTENSION, SECOND TRIMESTER: ICD-10-CM

## 2023-08-08 PROCEDURE — 59025 FETAL NON-STRESS TEST: CPT | Performed by: OBSTETRICS & GYNECOLOGY

## 2023-08-08 NOTE — LETTER
August 8, 2023     [place], [address] [address]    Patient: Juliana Ward   YOB: 1995   Date of Visit: 8/8/2023       Dear Yamileth Mack: Thank you for referring Dwight Grandy to me for evaluation. Below are my notes for this consultation. If you have questions, please do not hesitate to call me. I look forward to following your patient along with you.          Sincerely,        Dilshad Chinchilla MD        CC: No Recipients

## 2023-08-08 NOTE — PROGRESS NOTES
Repeat Non-Stress Testing:    Patient verbalizes +FM. Pt denies ALL:               Leaking of fluid   Contractions   Vaginal bleeding   Decreased fetal movement    Patient is performing daily kick counts. Patient has no questions or concerns. NST strip reviewed by Dr. Cassidy Nava.

## 2023-08-14 ENCOUNTER — APPOINTMENT (OUTPATIENT)
Facility: HOSPITAL | Age: 28
End: 2023-08-14
Payer: COMMERCIAL

## 2023-08-14 ENCOUNTER — ULTRASOUND (OUTPATIENT)
Facility: HOSPITAL | Age: 28
End: 2023-08-14
Payer: COMMERCIAL

## 2023-08-14 VITALS
DIASTOLIC BLOOD PRESSURE: 72 MMHG | BODY MASS INDEX: 31.59 KG/M2 | HEIGHT: 65 IN | SYSTOLIC BLOOD PRESSURE: 130 MMHG | WEIGHT: 189.6 LBS | HEART RATE: 94 BPM

## 2023-08-14 DIAGNOSIS — Z3A.31 31 WEEKS GESTATION OF PREGNANCY: ICD-10-CM

## 2023-08-14 DIAGNOSIS — O13.2 GESTATIONAL HYPERTENSION, SECOND TRIMESTER: Primary | ICD-10-CM

## 2023-08-14 PROCEDURE — 76815 OB US LIMITED FETUS(S): CPT | Performed by: OBSTETRICS & GYNECOLOGY

## 2023-08-14 PROCEDURE — 59025 FETAL NON-STRESS TEST: CPT | Performed by: OBSTETRICS & GYNECOLOGY

## 2023-08-14 NOTE — PROGRESS NOTES
Repeat Non-Stress Testing:    Patient verbalizes +FM. Pt denies ALL:               Leaking of fluid   Contractions   Vaginal bleeding   Decreased fetal movement    Patient is performing daily kick counts. Patient has no questions or concerns. NST strip reviewed by Dr. Hernandez Hilliard.

## 2023-08-15 PROBLEM — O13.3 GESTATIONAL HYPERTENSION, THIRD TRIMESTER: Status: ACTIVE | Noted: 2023-06-09

## 2023-08-16 ENCOUNTER — ROUTINE PRENATAL (OUTPATIENT)
Facility: HOSPITAL | Age: 28
End: 2023-08-16
Payer: COMMERCIAL

## 2023-08-16 VITALS
DIASTOLIC BLOOD PRESSURE: 76 MMHG | BODY MASS INDEX: 31.49 KG/M2 | HEART RATE: 98 BPM | WEIGHT: 189 LBS | HEIGHT: 65 IN | SYSTOLIC BLOOD PRESSURE: 136 MMHG

## 2023-08-16 DIAGNOSIS — Z3A.31 31 WEEKS GESTATION OF PREGNANCY: ICD-10-CM

## 2023-08-16 DIAGNOSIS — O13.3 GESTATIONAL HYPERTENSION, THIRD TRIMESTER: Primary | ICD-10-CM

## 2023-08-16 PROCEDURE — 59025 FETAL NON-STRESS TEST: CPT | Performed by: STUDENT IN AN ORGANIZED HEALTH CARE EDUCATION/TRAINING PROGRAM

## 2023-08-16 NOTE — PROGRESS NOTES
Repeat Non-Stress Testing:    Patient verbalizes +FM. Pt denies ALL:               Leaking of fluid   Contractions   Vaginal bleeding   Decreased fetal movement    Patient is performing daily kick counts. Patient has no questions or concerns. NST strip reviewed by Dr. Marcy Hodgkins.

## 2023-08-17 ENCOUNTER — ROUTINE PRENATAL (OUTPATIENT)
Dept: OBGYN CLINIC | Facility: CLINIC | Age: 28
End: 2023-08-17

## 2023-08-17 VITALS
DIASTOLIC BLOOD PRESSURE: 86 MMHG | BODY MASS INDEX: 31.89 KG/M2 | SYSTOLIC BLOOD PRESSURE: 137 MMHG | HEART RATE: 105 BPM | WEIGHT: 191.4 LBS | HEIGHT: 65 IN

## 2023-08-17 DIAGNOSIS — Z34.93 PRENATAL CARE, THIRD TRIMESTER: Chronic | ICD-10-CM

## 2023-08-17 DIAGNOSIS — O13.3 GESTATIONAL HYPERTENSION, THIRD TRIMESTER: Primary | ICD-10-CM

## 2023-08-17 DIAGNOSIS — Z3A.31 31 WEEKS GESTATION OF PREGNANCY: ICD-10-CM

## 2023-08-17 PROCEDURE — 99213 OFFICE O/P EST LOW 20 MIN: CPT | Performed by: NURSE PRACTITIONER

## 2023-08-17 NOTE — PROGRESS NOTES
[address]  OB/GYN prenatal visit    S: 33 y.o. Serafin Charter 31w4d here for PN visit. She has no obstetric complaints, including pelvic pain, contractions, vaginal bleeding, loss of fluid, or decreased fetal movement. Patient denies any headaches, visual changes or epigastric pain. O:  Vitals:    23 1522   BP: 137/86   Pulse: 105       Gen: no acute distress, nonlabored breathing  Fundal Height (cm): 31 cm  Fetal Heart Rate: 140    A/P:      IUP at 31w4d  No obstetric complaints today  TWG: + 41 lbs ( 25-35 lbs recommended in pregnancy)   Needs to complete her 28 wk labs and UPCR, CMP  US 23  NST breech,   Continue APFS  Vaccinations: 23  Birth plan:  reviewed delivery recommended at 40 wks due to [facility], increased risk for preeclampsia. Continue to monitor BP's at home call if 140/90  Discussed  labor precautions and fetal kick counts    Return to clinic in 2 weeks    Problem List        Cardiovascular and Mediastinum    Gestational hypertension, third trimester    Overview     Patient has had mild elevated BP's nonpregnant  Newly diagnosed GHTN awaiting urine protein/creatnine ratio - pt reminded to complete  Nml LFT's and Platelets  BP cuff to monitor at home- continue to remain normal at home  BP wnl today            Other    Prenatal care, third trimester (Chronic)    31 weeks gestation of pregnancy    Overview     PN labs wnl  25 to 35 pounds recommended weight gain in pregnancy  Genetic screening - NIPS, msAFP -negative screen. 23 US Left lateral pedunculated fibroid 3.7x3. 4x4.1 cm  Contraception - POPs  Plans on bottlefeeding breast-feeding  S/p tdap  28 week labs - reminded to complete  Delivery consent signed                 Gretchen Clark, [address] [place] [address]  2023  4:50 PM

## 2023-08-21 ENCOUNTER — ROUTINE PRENATAL (OUTPATIENT)
Facility: HOSPITAL | Age: 28
End: 2023-08-21
Payer: COMMERCIAL

## 2023-08-21 VITALS
HEIGHT: 65 IN | WEIGHT: 193.8 LBS | HEART RATE: 90 BPM | SYSTOLIC BLOOD PRESSURE: 110 MMHG | DIASTOLIC BLOOD PRESSURE: 70 MMHG | BODY MASS INDEX: 32.29 KG/M2

## 2023-08-21 DIAGNOSIS — O13.3 GESTATIONAL HYPERTENSION, THIRD TRIMESTER: ICD-10-CM

## 2023-08-21 DIAGNOSIS — Z3A.32 32 WEEKS GESTATION OF PREGNANCY: Primary | ICD-10-CM

## 2023-08-21 PROCEDURE — 59025 FETAL NON-STRESS TEST: CPT | Performed by: OBSTETRICS & GYNECOLOGY

## 2023-08-21 NOTE — PROGRESS NOTES
Repeat Non-Stress Testing:    Patient verbalizes +FM. Pt denies ALL:               Leaking of fluid   Contractions   Vaginal bleeding   Decreased fetal movement    Patient is performing daily kick counts. Patient has no questions or concerns. NST strip reviewed by Dr. Amparo Grewal.

## 2023-08-21 NOTE — PROGRESS NOTES
The patient had a nonstress test in the office. I have reviewed the nonstress test and agree with the documentation.

## 2023-08-24 ENCOUNTER — APPOINTMENT (OUTPATIENT)
Dept: LAB | Facility: HOSPITAL | Age: 28
End: 2023-08-24
Payer: COMMERCIAL

## 2023-08-24 ENCOUNTER — ULTRASOUND (OUTPATIENT)
Age: 28
End: 2023-08-24
Payer: COMMERCIAL

## 2023-08-24 VITALS
BODY MASS INDEX: 32.55 KG/M2 | HEIGHT: 65 IN | WEIGHT: 195.4 LBS | HEART RATE: 100 BPM | DIASTOLIC BLOOD PRESSURE: 72 MMHG | SYSTOLIC BLOOD PRESSURE: 118 MMHG

## 2023-08-24 DIAGNOSIS — O13.3 GESTATIONAL HYPERTENSION, THIRD TRIMESTER: ICD-10-CM

## 2023-08-24 DIAGNOSIS — Z3A.32 32 WEEKS GESTATION OF PREGNANCY: ICD-10-CM

## 2023-08-24 DIAGNOSIS — Z3A.26 26 WEEKS GESTATION OF PREGNANCY: ICD-10-CM

## 2023-08-24 DIAGNOSIS — O13.3 GESTATIONAL HYPERTENSION, THIRD TRIMESTER: Primary | ICD-10-CM

## 2023-08-24 LAB
ABO GROUP BLD: NORMAL
ALBUMIN SERPL BCP-MCNC: 3.6 G/DL (ref 3.5–5)
ALP SERPL-CCNC: 140 U/L (ref 34–104)
ALT SERPL W P-5'-P-CCNC: 9 U/L (ref 7–52)
ANION GAP SERPL CALCULATED.3IONS-SCNC: 10 MMOL/L
AST SERPL W P-5'-P-CCNC: 15 U/L (ref 13–39)
BASOPHILS # BLD AUTO: 0.04 THOUSANDS/ÂΜL (ref 0–0.1)
BASOPHILS NFR BLD AUTO: 0 % (ref 0–1)
BILIRUB SERPL-MCNC: 0.3 MG/DL (ref 0.2–1)
BLD GP AB SCN SERPL QL: NEGATIVE
BUN SERPL-MCNC: 7 MG/DL (ref 5–25)
CALCIUM SERPL-MCNC: 9.1 MG/DL (ref 8.4–10.2)
CHLORIDE SERPL-SCNC: 102 MMOL/L (ref 96–108)
CO2 SERPL-SCNC: 22 MMOL/L (ref 21–32)
CREAT SERPL-MCNC: 0.67 MG/DL (ref 0.6–1.3)
CREAT UR-MCNC: 34.8 MG/DL
EOSINOPHIL # BLD AUTO: 0.13 THOUSAND/ÂΜL (ref 0–0.61)
EOSINOPHIL NFR BLD AUTO: 1 % (ref 0–6)
ERYTHROCYTE [DISTWIDTH] IN BLOOD BY AUTOMATED COUNT: 14.6 % (ref 11.6–15.1)
GFR SERPL CREATININE-BSD FRML MDRD: 121 ML/MIN/1.73SQ M
GLUCOSE SERPL-MCNC: 75 MG/DL (ref 65–140)
HCT VFR BLD AUTO: 33.6 % (ref 34.8–46.1)
HGB BLD-MCNC: 10.7 G/DL (ref 11.5–15.4)
IMM GRANULOCYTES # BLD AUTO: 0.1 THOUSAND/UL (ref 0–0.2)
IMM GRANULOCYTES NFR BLD AUTO: 1 % (ref 0–2)
LYMPHOCYTES # BLD AUTO: 1.5 THOUSANDS/ÂΜL (ref 0.6–4.47)
LYMPHOCYTES NFR BLD AUTO: 13 % (ref 14–44)
MCH RBC QN AUTO: 26 PG (ref 26.8–34.3)
MCHC RBC AUTO-ENTMCNC: 31.8 G/DL (ref 31.4–37.4)
MCV RBC AUTO: 82 FL (ref 82–98)
MONOCYTES # BLD AUTO: 1.03 THOUSAND/ÂΜL (ref 0.17–1.22)
MONOCYTES NFR BLD AUTO: 9 % (ref 4–12)
NEUTROPHILS # BLD AUTO: 8.38 THOUSANDS/ÂΜL (ref 1.85–7.62)
NEUTS SEG NFR BLD AUTO: 76 % (ref 43–75)
NRBC BLD AUTO-RTO: 0 /100 WBCS
PLATELET # BLD AUTO: 255 THOUSANDS/UL (ref 149–390)
PMV BLD AUTO: 10.5 FL (ref 8.9–12.7)
POTASSIUM SERPL-SCNC: 3.9 MMOL/L (ref 3.5–5.3)
PROT SERPL-MCNC: 6.6 G/DL (ref 6.4–8.4)
PROT UR-MCNC: 7 MG/DL
PROT/CREAT UR: 0.2 MG/G{CREAT} (ref 0–0.1)
RBC # BLD AUTO: 4.12 MILLION/UL (ref 3.81–5.12)
RH BLD: POSITIVE
SODIUM SERPL-SCNC: 134 MMOL/L (ref 135–147)
SPECIMEN EXPIRATION DATE: NORMAL
WBC # BLD AUTO: 11.18 THOUSAND/UL (ref 4.31–10.16)

## 2023-08-24 PROCEDURE — 86780 TREPONEMA PALLIDUM: CPT

## 2023-08-24 PROCEDURE — 59025 FETAL NON-STRESS TEST: CPT | Performed by: OBSTETRICS & GYNECOLOGY

## 2023-08-24 PROCEDURE — 36415 COLL VENOUS BLD VENIPUNCTURE: CPT

## 2023-08-24 PROCEDURE — 76815 OB US LIMITED FETUS(S): CPT | Performed by: OBSTETRICS & GYNECOLOGY

## 2023-08-24 PROCEDURE — 86901 BLOOD TYPING SEROLOGIC RH(D): CPT

## 2023-08-24 PROCEDURE — 80053 COMPREHEN METABOLIC PANEL: CPT

## 2023-08-24 PROCEDURE — 86900 BLOOD TYPING SEROLOGIC ABO: CPT

## 2023-08-24 PROCEDURE — 86850 RBC ANTIBODY SCREEN: CPT

## 2023-08-25 LAB — TREPONEMA PALLIDUM IGG+IGM AB [PRESENCE] IN SERUM OR PLASMA BY IMMUNOASSAY: NORMAL

## 2023-08-28 PROCEDURE — T1002 RN SERVICES UP TO 15 MINUTES: HCPCS

## 2023-08-29 ENCOUNTER — ULTRASOUND (OUTPATIENT)
Facility: HOSPITAL | Age: 28
End: 2023-08-29
Payer: COMMERCIAL

## 2023-08-29 VITALS
WEIGHT: 194.8 LBS | HEIGHT: 65 IN | HEART RATE: 90 BPM | DIASTOLIC BLOOD PRESSURE: 58 MMHG | BODY MASS INDEX: 32.46 KG/M2 | SYSTOLIC BLOOD PRESSURE: 118 MMHG

## 2023-08-29 DIAGNOSIS — O13.3 GESTATIONAL HYPERTENSION, THIRD TRIMESTER: Primary | ICD-10-CM

## 2023-08-29 DIAGNOSIS — Z36.89 ENCOUNTER FOR ULTRASOUND TO CHECK FETAL GROWTH: ICD-10-CM

## 2023-08-29 DIAGNOSIS — Z3A.33 33 WEEKS GESTATION OF PREGNANCY: ICD-10-CM

## 2023-08-29 DIAGNOSIS — O99.013 ANEMIA AFFECTING PREGNANCY IN THIRD TRIMESTER: Primary | ICD-10-CM

## 2023-08-29 PROCEDURE — 99213 OFFICE O/P EST LOW 20 MIN: CPT | Performed by: STUDENT IN AN ORGANIZED HEALTH CARE EDUCATION/TRAINING PROGRAM

## 2023-08-29 PROCEDURE — 59025 FETAL NON-STRESS TEST: CPT | Performed by: STUDENT IN AN ORGANIZED HEALTH CARE EDUCATION/TRAINING PROGRAM

## 2023-08-29 PROCEDURE — 76816 OB US FOLLOW-UP PER FETUS: CPT | Performed by: STUDENT IN AN ORGANIZED HEALTH CARE EDUCATION/TRAINING PROGRAM

## 2023-08-29 RX ORDER — FERROUS SULFATE TAB EC 324 MG (65 MG FE EQUIVALENT) 324 (65 FE) MG
324 TABLET DELAYED RESPONSE ORAL
Qty: 30 TABLET | Refills: 1 | Status: ON HOLD | OUTPATIENT
Start: 2023-08-29

## 2023-08-29 RX ORDER — DOCUSATE SODIUM 100 MG/1
100 CAPSULE, LIQUID FILLED ORAL 2 TIMES DAILY PRN
Qty: 60 CAPSULE | Refills: 1 | Status: ON HOLD | OUTPATIENT
Start: 2023-08-29 | End: 2023-10-28

## 2023-08-29 NOTE — PROGRESS NOTES
Repeat Non-Stress Testing:    Patient verbalizes +FM. Pt denies ALL:               Leaking of fluid   Contractions   Vaginal bleeding   Decreased fetal movement    Patient is performing daily kick counts. Patient has no questions or concerns. NST strip reviewed by Dr. Jamshid Chen.

## 2023-08-29 NOTE — PROGRESS NOTES
[address]: Ms. Lianne Allen was seen today for NST (found under the pregnancy episode) which I reviewed the RN assessment and agree, and fetal growth ultrasound (see ultrasound report under OB procedures tab). MDM:   I. Diagnoses/Problems addressed:  gestational hypertension  II. Data: I reviewed 3 lab tests ordered by another provider. III. Risk of morbidity: Low    Please don't hesitate to contact our office with any concerns or questions.   -Marylene No, MD

## 2023-08-29 NOTE — LETTER
August 29, 2023     Patient: Morena Goodenr  YOB: 1995  Date of Visit: 8/29/2023      To Whom it May Concern:    Zack Gallegos is under my professional care. Cornelio Jones was seen in my office on 8/29/2023. If you have any questions or concerns, please don't hesitate to call.          Sincerely,          Mitzi Vines MD        CC: No Recipients

## 2023-08-30 ENCOUNTER — ROUTINE PRENATAL (OUTPATIENT)
Dept: OBGYN CLINIC | Facility: CLINIC | Age: 28
End: 2023-08-30

## 2023-08-30 VITALS
SYSTOLIC BLOOD PRESSURE: 133 MMHG | WEIGHT: 194 LBS | BODY MASS INDEX: 32.32 KG/M2 | RESPIRATION RATE: 18 BRPM | HEIGHT: 65 IN | HEART RATE: 99 BPM | DIASTOLIC BLOOD PRESSURE: 85 MMHG

## 2023-08-30 DIAGNOSIS — Z3A.33 33 WEEKS GESTATION OF PREGNANCY: ICD-10-CM

## 2023-08-30 DIAGNOSIS — Z34.93 PRENATAL CARE, THIRD TRIMESTER: Chronic | ICD-10-CM

## 2023-08-30 DIAGNOSIS — O13.3 GESTATIONAL HYPERTENSION, THIRD TRIMESTER: Primary | ICD-10-CM

## 2023-08-30 PROCEDURE — 99213 OFFICE O/P EST LOW 20 MIN: CPT | Performed by: NURSE PRACTITIONER

## 2023-08-30 NOTE — PROGRESS NOTES
[facility]  OB/GYN prenatal visit    S: 33 y.o. Sanjeev Pin 33w4d here for PN visit. She has no obstetric complaints, including pelvic pain, contractions, vaginal bleeding, loss of fluid, or decreased fetal movement. She denies any headaches, visual changes or right upper quadrant pain. Her blood pressures at home last checked was 118/87. Last UPCR was 0.2, plts normal, LFT's wnl for pregnancy. O:  Vitals:    23 1406   BP: 133/85   Pulse: 99   Resp: 18       Gen: no acute distress, nonlabored breathing  Fundal Height (cm): 34 cm  Fetal Heart Rate: 147    A/P:      IUP at 33w4d  No obstetric complaints today  Anemia- hgb 10.4 iron supplements and Colace ordered  Ultrasound 2023, vertex, EFW 62%,  GHTN- delivery recommended at 37 weeks 0 days, preeclamptic labs within normal, will recheck weekly-ordered today, last done 1 week ago. To complete her 1 hour GTT  Vaccinations: Tdap 2023  Birth plan: mIOL at 40 w 0d, please schedule at next appointment  Discussed  labor precautions, preeclamptic precautions, continue to monitor blood pressures at home, call if 140/90, also has APFS appointments. Return to clinic in 2 weeks    Problem List        Cardiovascular and Mediastinum    Gestational hypertension, third trimester    Overview     Patient has had mild elevated BP's nonpregnant  Newly diagnosed GHTN awaiting urine protein/creatnine ratio - pt reminded to complete  Nml LFT's and Platelets  BP cuff to monitor at home- continue to remain normal at home  BP wnl today  Check CMP, CBC, UPCR weekly, 23 UPCR 0.2            Other    Prenatal care, third trimester (Chronic)    33 weeks gestation of pregnancy    Overview     PN labs wnl  25 to 35 pounds recommended weight gain in pregnancy  Genetic screening - NIPS, msAFP -negative screen. 23  Left lateral pedunculated fibroid 3.7x3. 4x4.1 cm  Contraception - POPs  Plans on bottlefeeding breast-feeding  S/p tdap  28 week labs - reminded to complete  Delivery consent signed 7/27              Nichole Gonzalez, [address] [place] [address]  8/30/2023  2:45 PM

## 2023-09-01 ENCOUNTER — ROUTINE PRENATAL (OUTPATIENT)
Facility: HOSPITAL | Age: 28
End: 2023-09-01
Payer: COMMERCIAL

## 2023-09-01 VITALS
WEIGHT: 194 LBS | HEART RATE: 76 BPM | DIASTOLIC BLOOD PRESSURE: 72 MMHG | HEIGHT: 65 IN | BODY MASS INDEX: 32.32 KG/M2 | SYSTOLIC BLOOD PRESSURE: 132 MMHG

## 2023-09-01 DIAGNOSIS — O13.3 GESTATIONAL HYPERTENSION, THIRD TRIMESTER: ICD-10-CM

## 2023-09-01 DIAGNOSIS — Z3A.33 33 WEEKS GESTATION OF PREGNANCY: Primary | ICD-10-CM

## 2023-09-01 PROCEDURE — 59025 FETAL NON-STRESS TEST: CPT | Performed by: STUDENT IN AN ORGANIZED HEALTH CARE EDUCATION/TRAINING PROGRAM

## 2023-09-01 NOTE — PROGRESS NOTES
Repeat Non-Stress Testing:    Patient verbalizes +FM. Pt denies ALL:               Leaking of fluid   Contractions   Vaginal bleeding   Decreased fetal movement    Patient is performing daily kick counts. Patient has no questions or concerns. NST strip reviewed by Dr. Carmen Barrera.

## 2023-09-05 NOTE — PROGRESS NOTES
Please refer to the [facility] ultrasound report in Ob Procedures for additional information regarding today's visit

## 2023-09-06 ENCOUNTER — ROUTINE PRENATAL (OUTPATIENT)
Age: 28
End: 2023-09-06
Payer: COMMERCIAL

## 2023-09-06 ENCOUNTER — ROUTINE PRENATAL (OUTPATIENT)
Dept: OBGYN CLINIC | Facility: CLINIC | Age: 28
End: 2023-09-06

## 2023-09-06 ENCOUNTER — APPOINTMENT (OUTPATIENT)
Dept: LAB | Facility: CLINIC | Age: 28
End: 2023-09-06
Payer: COMMERCIAL

## 2023-09-06 VITALS
RESPIRATION RATE: 18 BRPM | DIASTOLIC BLOOD PRESSURE: 84 MMHG | WEIGHT: 199 LBS | BODY MASS INDEX: 33.15 KG/M2 | SYSTOLIC BLOOD PRESSURE: 140 MMHG | HEART RATE: 88 BPM | HEIGHT: 65 IN

## 2023-09-06 VITALS
HEIGHT: 65 IN | SYSTOLIC BLOOD PRESSURE: 130 MMHG | WEIGHT: 198.8 LBS | DIASTOLIC BLOOD PRESSURE: 60 MMHG | BODY MASS INDEX: 33.12 KG/M2 | HEART RATE: 99 BPM

## 2023-09-06 DIAGNOSIS — O13.3 GESTATIONAL HYPERTENSION, THIRD TRIMESTER: ICD-10-CM

## 2023-09-06 DIAGNOSIS — O13.3 GESTATIONAL HYPERTENSION, THIRD TRIMESTER: Primary | ICD-10-CM

## 2023-09-06 DIAGNOSIS — Z3A.34 34 WEEKS GESTATION OF PREGNANCY: Primary | ICD-10-CM

## 2023-09-06 DIAGNOSIS — Z3A.33 33 WEEKS GESTATION OF PREGNANCY: ICD-10-CM

## 2023-09-06 LAB
ALBUMIN SERPL BCP-MCNC: 3.1 G/DL (ref 3.5–5)
ALP SERPL-CCNC: 148 U/L (ref 34–104)
ALT SERPL W P-5'-P-CCNC: 9 U/L (ref 7–52)
ANION GAP SERPL CALCULATED.3IONS-SCNC: 8 MMOL/L
AST SERPL W P-5'-P-CCNC: 15 U/L (ref 13–39)
BASOPHILS # BLD AUTO: 0.04 THOUSANDS/ÂΜL (ref 0–0.1)
BASOPHILS NFR BLD AUTO: 0 % (ref 0–1)
BILIRUB SERPL-MCNC: 0.27 MG/DL (ref 0.2–1)
BUN SERPL-MCNC: 6 MG/DL (ref 5–25)
CALCIUM ALBUM COR SERPL-MCNC: 9.5 MG/DL (ref 8.3–10.1)
CALCIUM SERPL-MCNC: 8.8 MG/DL (ref 8.4–10.2)
CHLORIDE SERPL-SCNC: 104 MMOL/L (ref 96–108)
CO2 SERPL-SCNC: 23 MMOL/L (ref 21–32)
CREAT SERPL-MCNC: 0.69 MG/DL (ref 0.6–1.3)
CREAT UR-MCNC: 32.4 MG/DL
EOSINOPHIL # BLD AUTO: 0.18 THOUSAND/ÂΜL (ref 0–0.61)
EOSINOPHIL NFR BLD AUTO: 2 % (ref 0–6)
ERYTHROCYTE [DISTWIDTH] IN BLOOD BY AUTOMATED COUNT: 15.1 % (ref 11.6–15.1)
GFR SERPL CREATININE-BSD FRML MDRD: 120 ML/MIN/1.73SQ M
GLUCOSE 1H P 50 G GLC PO SERPL-MCNC: 117 MG/DL (ref 40–134)
GLUCOSE SERPL-MCNC: 116 MG/DL (ref 65–140)
HCT VFR BLD AUTO: 31.9 % (ref 34.8–46.1)
HGB BLD-MCNC: 9.5 G/DL (ref 11.5–15.4)
IMM GRANULOCYTES # BLD AUTO: 0.11 THOUSAND/UL (ref 0–0.2)
IMM GRANULOCYTES NFR BLD AUTO: 1 % (ref 0–2)
LYMPHOCYTES # BLD AUTO: 1.22 THOUSANDS/ÂΜL (ref 0.6–4.47)
LYMPHOCYTES NFR BLD AUTO: 12 % (ref 14–44)
MCH RBC QN AUTO: 24.4 PG (ref 26.8–34.3)
MCHC RBC AUTO-ENTMCNC: 29.8 G/DL (ref 31.4–37.4)
MCV RBC AUTO: 82 FL (ref 82–98)
MONOCYTES # BLD AUTO: 0.84 THOUSAND/ÂΜL (ref 0.17–1.22)
MONOCYTES NFR BLD AUTO: 8 % (ref 4–12)
NEUTROPHILS # BLD AUTO: 7.98 THOUSANDS/ÂΜL (ref 1.85–7.62)
NEUTS SEG NFR BLD AUTO: 77 % (ref 43–75)
NRBC BLD AUTO-RTO: 0 /100 WBCS
PLATELET # BLD AUTO: 228 THOUSANDS/UL (ref 149–390)
PMV BLD AUTO: 10.9 FL (ref 8.9–12.7)
POTASSIUM SERPL-SCNC: 3.8 MMOL/L (ref 3.5–5.3)
PROT SERPL-MCNC: 5.9 G/DL (ref 6.4–8.4)
PROT UR-MCNC: 7 MG/DL
PROT/CREAT UR: 0.22 MG/G{CREAT} (ref 0–0.1)
RBC # BLD AUTO: 3.9 MILLION/UL (ref 3.81–5.12)
SODIUM SERPL-SCNC: 135 MMOL/L (ref 135–147)
WBC # BLD AUTO: 10.37 THOUSAND/UL (ref 4.31–10.16)

## 2023-09-06 PROCEDURE — 80053 COMPREHEN METABOLIC PANEL: CPT | Performed by: NURSE PRACTITIONER

## 2023-09-06 PROCEDURE — 76815 OB US LIMITED FETUS(S): CPT | Performed by: OBSTETRICS & GYNECOLOGY

## 2023-09-06 PROCEDURE — 99213 OFFICE O/P EST LOW 20 MIN: CPT | Performed by: NURSE PRACTITIONER

## 2023-09-06 PROCEDURE — 84156 ASSAY OF PROTEIN URINE: CPT | Performed by: NURSE PRACTITIONER

## 2023-09-06 PROCEDURE — 82570 ASSAY OF URINE CREATININE: CPT | Performed by: NURSE PRACTITIONER

## 2023-09-06 PROCEDURE — 85025 COMPLETE CBC W/AUTO DIFF WBC: CPT | Performed by: NURSE PRACTITIONER

## 2023-09-06 PROCEDURE — 59025 FETAL NON-STRESS TEST: CPT | Performed by: OBSTETRICS & GYNECOLOGY

## 2023-09-06 NOTE — PROGRESS NOTES
Repeat Non-Stress Testing:    Patient verbalizes +FM. Pt denies ALL:               Leaking of fluid   Contractions   Vaginal bleeding   Decreased fetal movement    Patient is performing daily kick counts. Patient has no questions or concerns. NST strip reviewed by Dr. Denise Colin.  BPP done with U/S.

## 2023-09-06 NOTE — PROGRESS NOTES
[address]  OB/GYN prenatal visit    S: 33 y.o. Susana Napoles 34w4d here for PN visit. She has no obstetric complaints, including pelvic pain, contractions, vaginal bleeding, loss of fluid, or decreased fetal movement. She denies any headaches any visual changes or right upper quadrant pain. She does report she had some swelling in her lower extremities. Last UPCR was 0.2 on 2023, platelets normal, she is to complete weekly preeclamptic labs and 1 hr GTT, patient desires to complete today. She is to begin APFS at the  center and has an appointment at 11:00 this morning  BP today is  140/84, her blood pressure at home today was 129/87. Follow-up at [facility] appt today. O:  Vitals:    23 0831   BP: 140/84   Pulse: 88   Resp: 18       Gen: no acute distress, nonlabored breathing  Fundal Height (cm): 36 cm  Fetal Heart Rate: 137    A/P:      IUP at 34w4d  No obstetric complaints today  TWG: + 51 lbs ( 25-35 lbs was recommended)  Birth plan: GHTN-recommend delivery at 37 weeks 0 days, preeclamptic labs are within normal, recheck weekly  To complete 1 hour GTT, and preeclamptic labs this morning  Discussed  labor precautions, preeclamptic precautions and fetal kick counts    Return to clinic in 1 weeks    Problem List        Cardiovascular and Mediastinum    Gestational hypertension, third trimester    Overview     Patient has had mild elevated BP's nonpregnant  Newly diagnosed GHTN awaiting urine protein/creatnine ratio - pt reminded to complete  Nml LFT's and Platelets  BP cuff to monitor at home- continue to remain normal at home  Check CMP, CBC, UPCR weekly, 23 UPCR 0.2, Getting labs done today 23            Other    Prenatal care, third trimester (Chronic)    33 weeks gestation of pregnancy    Overview     PN labs wnl  25 to 35 pounds recommended weight gain in pregnancy  Genetic screening - NIPS, msAFP -negative screen.   23  Left lateral pedunculated fibroid 3.7x3. 4x4.1 cm  Contraception - POPs  Plans on bottlefeeding breast-feeding  S/p tdap  28 week labs - reminded to complete  Delivery consent signed 7/27                  Fiona Hayes, [address] [place] [address]  9/6/2023  8:53 AM

## 2023-09-06 NOTE — LETTER
September 8, 2023     Socorro Mejía MD  [address] [facility]  [address]    Patient: Braxton Molina   YOB: 1995   Date of Visit: 9/6/2023       Dear Dr. Liss Mehta: Thank you for referring Rianna Laura to me for evaluation. Below are my notes for this consultation. If you have questions, please do not hesitate to call me. I look forward to following your patient along with you.          Sincerely,        Ryan Meredith MD        CC: No Recipients    Ryan Meredith MD  9/5/2023  2:08 PM  Sign when Signing Visit  Please refer to the [facility] ultrasound report in Ob Procedures for additional information regarding today's visit

## 2023-09-08 ENCOUNTER — TELEPHONE (OUTPATIENT)
Dept: OBGYN CLINIC | Facility: CLINIC | Age: 28
End: 2023-09-08

## 2023-09-08 ENCOUNTER — ROUTINE PRENATAL (OUTPATIENT)
Facility: HOSPITAL | Age: 28
End: 2023-09-08
Payer: COMMERCIAL

## 2023-09-08 VITALS
WEIGHT: 198.8 LBS | HEART RATE: 95 BPM | SYSTOLIC BLOOD PRESSURE: 118 MMHG | BODY MASS INDEX: 33.12 KG/M2 | DIASTOLIC BLOOD PRESSURE: 64 MMHG | HEIGHT: 65 IN

## 2023-09-08 DIAGNOSIS — Z3A.34 34 WEEKS GESTATION OF PREGNANCY: Primary | ICD-10-CM

## 2023-09-08 DIAGNOSIS — O13.3 GESTATIONAL HYPERTENSION, THIRD TRIMESTER: ICD-10-CM

## 2023-09-08 PROCEDURE — 59025 FETAL NON-STRESS TEST: CPT | Performed by: OBSTETRICS & GYNECOLOGY

## 2023-09-08 NOTE — TELEPHONE ENCOUNTER
Attempted to call, left a message asking for patient to call the office to review test results and recommendations. Office number provided in message.

## 2023-09-08 NOTE — TELEPHONE ENCOUNTER
----- Message from Lexi [address], [place] sent at 9/8/2023 11:28 AM EDT -----  Please inform Murali Marcano that she passed her 1 hr GTT,  Syphilis was negative, her hgb was lower- verify she is taking her iron on an empty stomach or take with citrus, also to take 2 x a day if tolerated- also increase dietary iron. Her urine protein test does not show preeclampsia range- recommend for her to continue to monitor her BP at home, call if persistent 140/90 or if she develops HA's, visual changes, dizziness or RT upper quadrant pain or any concerns. Keep appt scheduled for next week- to be scheduled for IOL at 37 wks.   Thanks

## 2023-09-08 NOTE — TELEPHONE ENCOUNTER
----- Message from Shyann [place], [place] sent at 9/8/2023 11:28 AM EDT -----  Please inform Claritza Baker that she passed her 1 hr GTT,  Syphilis was negative, her hgb was lower- verify she is taking her iron on an empty stomach or take with citrus, also to take 2 x a day if tolerated- also increase dietary iron. Her urine protein test does not show preeclampsia range- recommend for her to continue to monitor her BP at home, call if persistent 140/90 or if she develops HA's, visual changes, dizziness or RT upper quadrant pain or any concerns. Keep appt scheduled for next week- to be scheduled for IOL at 37 wks.   Thanks

## 2023-09-08 NOTE — PROGRESS NOTES
Repeat Non-Stress Testing:    Patient verbalizes +FM. Pt denies ALL:               Leaking of fluid   Contractions   Vaginal bleeding   Decreased fetal movement    Patient is performing daily kick counts. Patient has no questions or concerns. NST strip reviewed by Dr. Kim Carter.

## 2023-09-08 NOTE — TELEPHONE ENCOUNTER
Pt called office after receiving our VM to call back regarding results. Informed pt of her results and she had stated she just left [facility] where they also explained her results. Pt verbalized understanding.

## 2023-09-11 ENCOUNTER — ROUTINE PRENATAL (OUTPATIENT)
Facility: HOSPITAL | Age: 28
End: 2023-09-11
Payer: COMMERCIAL

## 2023-09-11 VITALS
SYSTOLIC BLOOD PRESSURE: 134 MMHG | DIASTOLIC BLOOD PRESSURE: 78 MMHG | HEART RATE: 98 BPM | WEIGHT: 198.4 LBS | BODY MASS INDEX: 33.05 KG/M2 | HEIGHT: 65 IN

## 2023-09-11 DIAGNOSIS — Z3A.35 35 WEEKS GESTATION OF PREGNANCY: Primary | ICD-10-CM

## 2023-09-11 DIAGNOSIS — O13.3 GESTATIONAL HYPERTENSION, THIRD TRIMESTER: ICD-10-CM

## 2023-09-11 PROCEDURE — 59025 FETAL NON-STRESS TEST: CPT | Performed by: OBSTETRICS & GYNECOLOGY

## 2023-09-11 NOTE — PROGRESS NOTES
Repeat Non-Stress Testing:    Patient verbalizes +FM. Pt denies ALL:               Leaking of fluid   Contractions   Vaginal bleeding   Decreased fetal movement    Patient is performing daily kick counts. Patient has no questions or concerns. NST strip and blood pressure reviewed by Dr. Roberto Jarrell prior to completion of appointment.

## 2023-09-13 ENCOUNTER — TELEPHONE (OUTPATIENT)
Dept: PERINATAL CARE | Facility: CLINIC | Age: 28
End: 2023-09-13

## 2023-09-13 ENCOUNTER — ULTRASOUND (OUTPATIENT)
Facility: HOSPITAL | Age: 28
End: 2023-09-13
Payer: COMMERCIAL

## 2023-09-13 ENCOUNTER — ROUTINE PRENATAL (OUTPATIENT)
Dept: OBGYN CLINIC | Facility: CLINIC | Age: 28
End: 2023-09-13

## 2023-09-13 VITALS
SYSTOLIC BLOOD PRESSURE: 136 MMHG | WEIGHT: 197.4 LBS | BODY MASS INDEX: 32.89 KG/M2 | DIASTOLIC BLOOD PRESSURE: 70 MMHG | HEART RATE: 88 BPM | HEIGHT: 65 IN

## 2023-09-13 VITALS
HEIGHT: 65 IN | BODY MASS INDEX: 32.99 KG/M2 | RESPIRATION RATE: 18 BRPM | DIASTOLIC BLOOD PRESSURE: 93 MMHG | SYSTOLIC BLOOD PRESSURE: 134 MMHG | HEART RATE: 81 BPM | WEIGHT: 198 LBS

## 2023-09-13 DIAGNOSIS — O13.3 GESTATIONAL HYPERTENSION, THIRD TRIMESTER: ICD-10-CM

## 2023-09-13 DIAGNOSIS — Z34.93 PRENATAL CARE, THIRD TRIMESTER: Primary | Chronic | ICD-10-CM

## 2023-09-13 DIAGNOSIS — Z3A.35 35 WEEKS GESTATION OF PREGNANCY: ICD-10-CM

## 2023-09-13 DIAGNOSIS — Z3A.35 35 WEEKS GESTATION OF PREGNANCY: Primary | ICD-10-CM

## 2023-09-13 PROCEDURE — 76818 FETAL BIOPHYS PROFILE W/NST: CPT | Performed by: OBSTETRICS & GYNECOLOGY

## 2023-09-13 PROCEDURE — 99213 OFFICE O/P EST LOW 20 MIN: CPT | Performed by: OBSTETRICS & GYNECOLOGY

## 2023-09-13 PROCEDURE — 87150 DNA/RNA AMPLIFIED PROBE: CPT | Performed by: OBSTETRICS & GYNECOLOGY

## 2023-09-13 NOTE — PROGRESS NOTES
Melody Villarreal presents today for routine OB visit at 35w4d. Blood Pressure: 134/93  Wt=89.8 kg (198 lb); Body mass index is 32.95 kg/m².; TWG=22.7 kg (50 lb)   ;   22.7  Abdomen: gravid, soft, non-tender. She reports fetal movement . Denies uterine contractions. Denies vaginal bleeding or leaking of fluid. Reports adequate fetal movement of at least 10 movements in 2 hours once daily. Scheduled for ultrasound 9/6/23. Reviewed premature labor precautions and fetal kick counts. Advised to continue medications and return in 36 weeks. Current Outpatient Medications   Medication Instructions   • Blood Pressure KIT Does not apply, Daily   • Blood Pressure Monitoring (RA Blood Pressure Cuff Monitor) JUVE USE AS DIRECTED every morning   • docusate sodium (COLACE) 100 mg, Oral, 2 times daily PRN   • ferrous sulfate 324 mg, Oral, Daily before breakfast   • Prenatal Vit-Fe Fumarate-FA (Prenatal Vitamin) 27-0.8 MG TABS 1 tablet, Oral, Daily         G1 Problems (from 03/13/23 to present)     Problem Noted Resolved    Gestational hypertension, third trimester 6/9/2023 by Olga Adams DO No    Overview Addendum 9/6/2023  8:58 AM by DARVIN Walton     Patient has had mild elevated BP's nonpregnant  Newly diagnosed GHTN awaiting urine protein/creatnine ratio - pt reminded to complete  Nml LFT's and Platelets  BP cuff to monitor at home- continue to remain normal at home  Check CMP, CBC, UPCR weekly, 8/24/23 UPCR 0.2, Getting labs done today 9/6/23         35 weeks gestation of pregnancy 3/21/2023 by Coco Mcclure MD No    Overview Addendum 9/13/2023 10:38 AM by Donny Powell DO     PN labs wnl  25 to 35 pounds recommended weight gain in pregnancy  Genetic screening - NIPS, msAFP -negative screen. 5/31/23  Left lateral pedunculated fibroid 3.7x3. 4x4.1 cm  Contraception - POPs  Plans on bottlefeeding breast-feeding  S/p tdap  28 week labs -complete  Delivery consent signed 7/27  -GBS obtained 9/13/23  Will schedule for a 37 week induction  Due to high risk of preeclampsia (9/23 @8pm) no

## 2023-09-13 NOTE — PROGRESS NOTES
Please refer to the [facility] ultrasound report in Ob Procedures for additional information regarding today's visit

## 2023-09-13 NOTE — LETTER
September 13, 2023     [address] [facility] PROVIDER    Patient: Gbariel Client   YOB: 1995   Date of Visit: 9/13/2023       Dear  Provider: Thank you for referring Francois Stout to me for evaluation. Below are my notes for this consultation. If you have questions, please do not hesitate to call me. I look forward to following your patient along with you.          Sincerely,        Edmond Lakhani MD        CC: No Recipients    Edmond Lakhani MD  9/13/2023 12:40 PM  Sign when Signing Visit  Please refer to the [facility] ultrasound report in Ob Procedures for additional information regarding today's visit

## 2023-09-13 NOTE — TELEPHONE ENCOUNTER
Spoke with patient and confirmed her nst/miriam MFM appointment had to be rescheduled to [facility] office. Patient verbalized understanding of new time, date and location of appointment. Patient denies further questions.

## 2023-09-13 NOTE — PROGRESS NOTES
Repeat Non-Stress Testing:    Patient verbalizes +FM. Pt denies ALL:               Leaking of fluid   Contractions   Vaginal bleeding   Decreased fetal movement    Patient is performing daily kick counts. Patient has no questions or concerns.    NST strip reviewed by Dr. Courtney Tavarez - P ordered

## 2023-09-14 PROCEDURE — T1002 RN SERVICES UP TO 15 MINUTES: HCPCS

## 2023-09-15 ENCOUNTER — VBI (OUTPATIENT)
Dept: ADMINISTRATIVE | Facility: OTHER | Age: 28
End: 2023-09-15

## 2023-09-15 LAB — GP B STREP DNA SPEC QL NAA+PROBE: POSITIVE

## 2023-09-15 NOTE — TELEPHONE ENCOUNTER
09/15/23 2:28 PM     VB [employer] SmartForm used to document caregap status.     Gabriella Braswell MA

## 2023-09-18 ENCOUNTER — ULTRASOUND (OUTPATIENT)
Dept: PERINATAL CARE | Facility: CLINIC | Age: 28
End: 2023-09-18
Payer: COMMERCIAL

## 2023-09-18 VITALS
WEIGHT: 201.8 LBS | BODY MASS INDEX: 33.62 KG/M2 | SYSTOLIC BLOOD PRESSURE: 148 MMHG | HEIGHT: 65 IN | HEART RATE: 98 BPM | DIASTOLIC BLOOD PRESSURE: 64 MMHG

## 2023-09-18 DIAGNOSIS — O13.3 GESTATIONAL HYPERTENSION, THIRD TRIMESTER: ICD-10-CM

## 2023-09-18 DIAGNOSIS — Z3A.36 36 WEEKS GESTATION OF PREGNANCY: Primary | ICD-10-CM

## 2023-09-18 PROCEDURE — 59025 FETAL NON-STRESS TEST: CPT | Performed by: STUDENT IN AN ORGANIZED HEALTH CARE EDUCATION/TRAINING PROGRAM

## 2023-09-18 PROCEDURE — 76815 OB US LIMITED FETUS(S): CPT | Performed by: STUDENT IN AN ORGANIZED HEALTH CARE EDUCATION/TRAINING PROGRAM

## 2023-09-18 NOTE — PROGRESS NOTES
Repeat Non-Stress Testing:    Patient verbalizes +FM. Pt denies ALL:               Leaking of fluid   Contractions   Vaginal bleeding   Decreased fetal movement    Patient is performing daily kick counts. Patient has no questions or concerns. NST strip reviewed by Dr. Monty Mattson.

## 2023-09-18 NOTE — PROGRESS NOTES
[id number]  [facility]: Ms. Isaiah Koenig was seen today for NST (found under the pregnancy episode) which I reviewed the RN assessment and agree, and TRACY (see ultrasound report under OB procedures tab). Please don't hesitate to contact our office with any concerns or questions.   -Zhao Mehta MD

## 2023-09-19 PROBLEM — Z3A.36 36 WEEKS GESTATION OF PREGNANCY: Status: ACTIVE | Noted: 2023-03-21

## 2023-09-20 ENCOUNTER — ROUTINE PRENATAL (OUTPATIENT)
Dept: OBGYN CLINIC | Facility: CLINIC | Age: 28
End: 2023-09-20

## 2023-09-20 ENCOUNTER — HOSPITAL ENCOUNTER (OUTPATIENT)
Facility: HOSPITAL | Age: 28
Discharge: HOME | DRG: 560 | End: 2023-09-20
Attending: OBSTETRICS & GYNECOLOGY | Admitting: OBSTETRICS & GYNECOLOGY
Payer: COMMERCIAL

## 2023-09-20 VITALS
HEART RATE: 100 BPM | TEMPERATURE: 98.5 F | RESPIRATION RATE: 18 BRPM | DIASTOLIC BLOOD PRESSURE: 76 MMHG | SYSTOLIC BLOOD PRESSURE: 128 MMHG

## 2023-09-20 VITALS
WEIGHT: 200 LBS | SYSTOLIC BLOOD PRESSURE: 149 MMHG | DIASTOLIC BLOOD PRESSURE: 93 MMHG | BODY MASS INDEX: 33.32 KG/M2 | HEIGHT: 65 IN | HEART RATE: 84 BPM | RESPIRATION RATE: 18 BRPM

## 2023-09-20 DIAGNOSIS — O16.9 HIGH BLOOD PRESSURE AFFECTING PREGNANCY, ANTEPARTUM: ICD-10-CM

## 2023-09-20 DIAGNOSIS — Z3A.36 36 WEEKS GESTATION OF PREGNANCY: ICD-10-CM

## 2023-09-20 DIAGNOSIS — Z34.93 PRENATAL CARE, THIRD TRIMESTER: Chronic | ICD-10-CM

## 2023-09-20 DIAGNOSIS — O13.3 GESTATIONAL HYPERTENSION, THIRD TRIMESTER: Primary | ICD-10-CM

## 2023-09-20 LAB
ALBUMIN SERPL BCP-MCNC: 3.3 G/DL (ref 3.5–5)
ALP SERPL-CCNC: 179 U/L (ref 34–104)
ALT SERPL W P-5'-P-CCNC: 8 U/L (ref 7–52)
ANION GAP SERPL CALCULATED.3IONS-SCNC: 9 MMOL/L
AST SERPL W P-5'-P-CCNC: 16 U/L (ref 13–39)
BASOPHILS # BLD AUTO: 0.03 THOUSANDS/ÂΜL (ref 0–0.1)
BASOPHILS NFR BLD AUTO: 0 % (ref 0–1)
BILIRUB SERPL-MCNC: 0.29 MG/DL (ref 0.2–1)
BUN SERPL-MCNC: 6 MG/DL (ref 5–25)
CALCIUM ALBUM COR SERPL-MCNC: 9.3 MG/DL (ref 8.3–10.1)
CALCIUM SERPL-MCNC: 8.7 MG/DL (ref 8.4–10.2)
CHLORIDE SERPL-SCNC: 105 MMOL/L (ref 96–108)
CO2 SERPL-SCNC: 20 MMOL/L (ref 21–32)
CREAT SERPL-MCNC: 0.71 MG/DL (ref 0.6–1.3)
CREAT UR-MCNC: 58.8 MG/DL
EOSINOPHIL # BLD AUTO: 0.14 THOUSAND/ÂΜL (ref 0–0.61)
EOSINOPHIL NFR BLD AUTO: 2 % (ref 0–6)
ERYTHROCYTE [DISTWIDTH] IN BLOOD BY AUTOMATED COUNT: 16 % (ref 11.6–15.1)
GFR SERPL CREATININE-BSD FRML MDRD: 117 ML/MIN/1.73SQ M
GLUCOSE SERPL-MCNC: 104 MG/DL (ref 65–140)
HCT VFR BLD AUTO: 30.9 % (ref 34.8–46.1)
HGB BLD-MCNC: 9.5 G/DL (ref 11.5–15.4)
IMM GRANULOCYTES # BLD AUTO: 0.08 THOUSAND/UL (ref 0–0.2)
IMM GRANULOCYTES NFR BLD AUTO: 1 % (ref 0–2)
LYMPHOCYTES # BLD AUTO: 1.02 THOUSANDS/ÂΜL (ref 0.6–4.47)
LYMPHOCYTES NFR BLD AUTO: 11 % (ref 14–44)
MCH RBC QN AUTO: 24.1 PG (ref 26.8–34.3)
MCHC RBC AUTO-ENTMCNC: 30.7 G/DL (ref 31.4–37.4)
MCV RBC AUTO: 78 FL (ref 82–98)
MONOCYTES # BLD AUTO: 0.71 THOUSAND/ÂΜL (ref 0.17–1.22)
MONOCYTES NFR BLD AUTO: 7 % (ref 4–12)
NEUTROPHILS # BLD AUTO: 7.58 THOUSANDS/ÂΜL (ref 1.85–7.62)
NEUTS SEG NFR BLD AUTO: 79 % (ref 43–75)
NRBC BLD AUTO-RTO: 0 /100 WBCS
PLATELET # BLD AUTO: 209 THOUSANDS/UL (ref 149–390)
PMV BLD AUTO: 10.8 FL (ref 8.9–12.7)
POTASSIUM SERPL-SCNC: 3.7 MMOL/L (ref 3.5–5.3)
PROT SERPL-MCNC: 6.5 G/DL (ref 6.4–8.4)
PROT UR-MCNC: 36 MG/DL
PROT/CREAT UR: 0.61 MG/G{CREAT} (ref 0–0.1)
RBC # BLD AUTO: 3.94 MILLION/UL (ref 3.81–5.12)
SL AMB  POCT GLUCOSE, UA: NORMAL
SL AMB LEUKOCYTE ESTERASE,UA: NORMAL
SL AMB POCT BILIRUBIN,UA: NORMAL
SL AMB POCT BLOOD,UA: NORMAL
SL AMB POCT CLARITY,UA: NORMAL
SL AMB POCT COLOR,UA: YELLOW
SL AMB POCT KETONES,UA: NORMAL
SL AMB POCT NITRITE,UA: NORMAL
SL AMB POCT PH,UA: 6.5
SL AMB POCT SPECIFIC GRAVITY,UA: 1.01
SL AMB POCT URINE PROTEIN: NORMAL
SL AMB POCT UROBILINOGEN: 0.2
SODIUM SERPL-SCNC: 134 MMOL/L (ref 135–147)
WBC # BLD AUTO: 9.56 THOUSAND/UL (ref 4.31–10.16)

## 2023-09-20 PROCEDURE — 85025 COMPLETE CBC W/AUTO DIFF WBC: CPT | Performed by: OBSTETRICS & GYNECOLOGY

## 2023-09-20 PROCEDURE — 99213 OFFICE O/P EST LOW 20 MIN: CPT | Performed by: NURSE PRACTITIONER

## 2023-09-20 PROCEDURE — 99213 OFFICE O/P EST LOW 20 MIN: CPT | Performed by: OBSTETRICS & GYNECOLOGY

## 2023-09-20 PROCEDURE — 84156 ASSAY OF PROTEIN URINE: CPT | Performed by: OBSTETRICS & GYNECOLOGY

## 2023-09-20 PROCEDURE — 99213 OFFICE O/P EST LOW 20 MIN: CPT

## 2023-09-20 PROCEDURE — 82570 ASSAY OF URINE CREATININE: CPT | Performed by: OBSTETRICS & GYNECOLOGY

## 2023-09-20 PROCEDURE — 81002 URINALYSIS NONAUTO W/O SCOPE: CPT | Performed by: NURSE PRACTITIONER

## 2023-09-20 PROCEDURE — 80053 COMPREHEN METABOLIC PANEL: CPT | Performed by: OBSTETRICS & GYNECOLOGY

## 2023-09-20 NOTE — PROGRESS NOTES
[facility]  OB/GYN prenatal visit    S: 33 y.o.  36w3d here for PN visit. She has no obstetric complaints, including pelvic pain, contractions, vaginal bleeding, loss of fluid, or decreased fetal movement. She reports she was sick over the weekend but feels better, had a negative COVID test at home. BP elevated today, denies HA's, visual changes or epigastric pain. Is scheduled for IOL for GHTN on 23. O:  Vitals:    23 1041   BP: 149/93   Pulse: 84   Resp: 18       Gen: no acute distress, nonlabored breathing  Fundal Height (cm): 36 cm  Fetal Heart Rate: 137  Urine dip 3+ protein  A/P:      IUP at 36w3d  Transferred to triage because of increased blood pressure and proteinuria. TWG: + 52 lbs ( 25-35 lbs recommended weight gain in pregnancy)  GBS positive  Birth plan: mIOL scheduled for 23 at 8pm at 37 wks    Problem List        Cardiovascular and Mediastinum    Gestational hypertension, third trimester    Overview     Patient has had mild elevated BP's nonpregnant  Newly diagnosed GHTN awaiting urine protein/creatnine ratio - pt reminded to complete  Nml LFT's and Platelets  BP cuff to monitor at home- continue to remain normal at home  CBC wnl   P/c ration . 22   cmp  TP 5.9, alkpo4 148, albumin 3.1                Other    Prenatal care, third trimester (Chronic)    36 weeks gestation of pregnancy    Overview     PN labs wnl  25 to 35 pounds recommended weight gain in pregnancy  Genetic screening - NIPS, msAFP -negative screen. 23  Left lateral pedunculated fibroid 3.7x3. 4x4.1 cm  Contraception - POPs  Plans on bottlefeeding breast-feeding  S/p tdap  28 week labs -complete  Delivery consent signed   -GBS positive  Will schedule for a 37 week induction  Due to high risk of preeclampsia 37 ( @ 8pm )        Other Visit Diagnoses     High blood pressure affecting pregnancy, antepartum                DARVIN Grewal  2023  11:09 AM

## 2023-09-20 NOTE — PROGRESS NOTES
L&D Triage Note - OB/GYN  Juanita Mehta 33 y.o. female MRN: [id number]  Unit/Bed#:  TRIAGE  Encounter: [id number]      ASSESSMENT:    Juanita Mehta is a 33 y.o. Ebony Barber at 36w4d presents with elevated pressures to 149/93 with 3+ proteinuria in the office today. Pregnancy complicated by gestational hypertension. Blood pressures in triage have been normotensive in OB triage. Protein/creatinine ratio today elevated to 0.6, patient now pre-eclampsia without severe features. She is scheduled for an induction at 37w0d and as she does not have severe features should continue with planned induction and home blood pressure monitoring until then. She was given return precautions and is stable for discharge. PLAN:    1) Pre-eclampsia without severe features  - Blood pressures in OB triage have been normotensive  -Asymptomatic  -Cr 0.71, AST 16, ALT 8, Plts 209  - Protein/creatinine ratio elevated to 0.6  - Continue with prior planned induction at 37 wks for gestational hypertension, now pre-E w/o severe features  -NST reactive and reassuring  - Discharge from [facility] triage with  labor precautions    - Reviewed rupture of membranes, false vs true labor, decreased fetal movement, and vaginal bleeding   - Pt to call provider with any concerns and follow up at her next scheduled prenatal appointment tomorrow   - Continue routine prenatal care   - Case discussed with Dr. Jayro Julien:    Juanita Mehta 33 y.o. Ebony Barber at 36w4d with an induction on 10/23/23 for gestational hypertension presents with elevated pressures to 149/93 with 3+ proteinuria in the office today. Patient denies SOB, chest pain, headaches, fever, RUQ pain, or N/V. Takes a prenatal vitamin and took aspirin for gestational HTN up until last . Patient reports a cough and runny nose over the weekend that has resolved.  She has recent COVID positive contacts, but has tested multiple times, all of which have been negative. Her current obstetrical history is significant for gestational hypertension. Her past obstetrical history: No prior pregnancies    Contractions: Absent  Leakage of fluid: Absent  Vaginal Bleeding: Absent  Fetal movement: present    OBJECTIVE:    Vitals:    09/20/23 1415   BP: 128/76   Pulse: 100   Resp:    Temp:        ROS:  Constitutional: Negative for changes in vision, fever, edema, headache  Respiratory: Negative for shortness of breath  Cardiovascular: Negative for chest pain  Gastrointestinal: Negative for nausea, vomiting, diarrhea     General Physical Exam:  General: in no apparent distress  Cardiovascular: well perfused  Lungs: non-labored breathing  Abdomen: abdomen is soft without significant tenderness, masses, organomegaly or guarding  Lower extremeties: nontender, mild edema non pitting      Fetal monitoring:  FHT:  145 bpm/ moderate variability / 15 x 15 accelerations present, absent decelerations  [place]: irritability    -Cr 0.71, AST 16, ALT 8, Plts 209  -P/C 0.6    Moustapha Shell, MS3    I saw and examined the patient with the medical student, reviewed her labs, blood pressures, and NST.     Carlos Guerrero MD  OBGYN PGY-1  9/20/2023 2:29 PM

## 2023-09-20 NOTE — DISCHARGE INSTRUCTIONS
Pregnancy at 28 to [address] [facility]:   What changes are happening with my body? You are considered full term at the beginning of 37 weeks. Your breathing may be easier if your baby has moved down into a head-down position. You may need to urinate more often because the baby may be pressing on your bladder. You may also feel more discomfort and get tired easily. How do I care for myself at this stage of my pregnancy? Eat a variety of healthy foods. Healthy foods include fruits, vegetables, whole-grain breads, low-fat dairy foods, beans, lean meats, and fish. Drink liquids as directed. Ask how much liquid to drink each day and which liquids are best for you. Limit caffeine to less than 200 milligrams each day. Limit your intake of fish to 2 servings each week. Choose fish low in mercury such as canned light tuna, shrimp, salmon, cod, or tilapia. Do not  eat fish high in mercury such as swordfish, tilefish, porsche mackerel, and shark. Take prenatal vitamins as directed. Your need for certain vitamins and minerals, such as folic acid, increases during pregnancy. Prenatal vitamins provide some of the extra vitamins and minerals you need. Prenatal vitamins may also help to decrease the risk of certain birth defects. Rest as needed. Put your feet up if you have swelling in your ankles and feet. Talk to your healthcare provider about exercise. Moderate exercise can help you stay fit. Your healthcare provider will help you plan an exercise program that is safe for you during pregnancy. Do not smoke. Smoking increases your risk of a miscarriage and other health problems during your pregnancy. Smoking can cause your baby to be born early or weigh less at birth. Ask your healthcare provider for information if you need help quitting. Do not drink alcohol. Alcohol passes from your body to your baby through the placenta.  It can affect your baby's brain development and cause fetal alcohol syndrome (FAS). FAS is a group of conditions that causes mental, behavior, and growth problems. Talk to your healthcare provider before you take any medicines. Many medicines may harm your baby if you take them when you are pregnant. Do not take any medicines, vitamins, herbs, or supplements without first talking to your healthcare provider. Never use illegal or street drugs (such as marijuana or cocaine) while you are pregnant. What are some safety tips during pregnancy? Avoid hot tubs and saunas. Do not use a hot tub or sauna while you are pregnant, especially during your first trimester. Hot tubs and saunas may raise your baby's temperature and increase the risk of birth defects. Avoid toxoplasmosis. This is an infection caused by eating raw meat or being around infected cat feces. It can cause birth defects, miscarriages, and other problems. Wash your hands after you touch raw meat. Make sure any meat is well-cooked before you eat it. Avoid raw eggs and unpasteurized milk. Use gloves or ask someone else to clean your cat's litter box while you are pregnant. Ask your healthcare provider about travel. The most comfortable time to travel is during the second trimester. Ask your provider if you can travel after 36 weeks. You may not be able to travel in an airplane after 36 weeks. He or she may also recommend you avoid long road trips. What changes are happening with my baby? By 38 weeks, your baby may weigh between 6 and 9 pounds. Your baby may be about 14 inches long from the top of the head to the rump (baby's bottom). Your baby hears well enough to know your voice. As your baby gets larger, you may feel fewer kicks and more stretching and rolling. Your baby may move into a head-down position. Your baby will also rest lower in your abdomen. What do I need to know about prenatal care? Your healthcare provider will check your blood pressure and weight.  You may also need the following:  A urine test  may also be done to check for sugar and protein. These can be signs of gestational diabetes or infection. Protein in your urine may also be a sign of preeclampsia. Preeclampsia is a condition that can develop during week 20 or later of your pregnancy. It causes high blood pressure, and it can cause problems with your kidneys and other organs. A gestational diabetes screen  may be done. Your healthcare provider may order either a 1-step or 2-step oral glucose tolerance test (OGTT). 1-step OGTT:  Your blood sugar level will be tested after you have not eaten for 8 hours (fasting). You will then be given a glucose drink. Your level will be tested again 1 hour and 2 hours after you finish the drink. 2-step OGTT:  You do not have to fast for the first part of the test. You will have the glucose drink at any time of day. Your blood sugar level will be checked 1 hour later. If your blood sugar is higher than a certain level, another test will be ordered. You will fast and your blood sugar level will be tested. You will have the glucose drink. Your blood will be tested again 1 hour, 2 hours, and 3 hours after you finish the glucose drink. A blood test  may be done to check for anemia (low iron level). A Tdap vaccine  may be recommended by your healthcare provider. A group B strep test  is a test that is done to check for group B strep infection. Group B strep is a type of bacteria that may be found in the vagina or rectum. It can be passed to your baby during delivery if you have it. Your healthcare provider will take swab your vagina or rectum and send the sample to the lab for tests. Fundal height  is a measurement of your uterus to check your baby's growth. This number is usually the same as the number of weeks that you have been pregnant. Your healthcare provider may also check your baby's position. Your baby's heart rate  will be checked.     When should I seek immediate care? You develop a severe headache that does not go away. You have new or increased vision changes, such as blurred or spotted vision. You have new or increased swelling in your face or hands. You have vaginal spotting or bleeding. Your water broke or you feel warm water gushing or trickling from your vagina. When should I call my obstetrician? You have more than 5 contractions in 1 hour. You notice any changes in your baby's movements. You have abdominal cramps, pressure, or tightening. You have a change in vaginal discharge. You have chills or a fever. You have vaginal itching, burning, or pain. You have yellow, green, white, or foul-smelling vaginal discharge. You have pain or burning when you urinate, less urine than usual, or pink or bloody urine. You have questions or concerns about your condition or care. CARE AGREEMENT:   You have the right to help plan your care. Learn about your health condition and how it may be treated. Discuss treatment options with your healthcare providers to decide what care you want to receive. You always have the right to refuse treatment. The above information is an  only. It is not intended as medical advice for individual conditions or treatments. Talk to your doctor, nurse or pharmacist before following any medical regimen to see if it is safe and effective for you. © Copyright [employer] 2022 Information is for End User's use only and may not be sold, redistributed or otherwise used for commercial purposes.  All illustrations and images included in CareNotes® are the copyrighted property of [employer].D.A[employer] Inc. or [address] [place] [address]

## 2023-09-22 ENCOUNTER — ROUTINE PRENATAL (OUTPATIENT)
Facility: HOSPITAL | Age: 28
End: 2023-09-22
Payer: COMMERCIAL

## 2023-09-22 VITALS
HEART RATE: 88 BPM | DIASTOLIC BLOOD PRESSURE: 80 MMHG | HEIGHT: 65 IN | WEIGHT: 198.8 LBS | SYSTOLIC BLOOD PRESSURE: 138 MMHG | BODY MASS INDEX: 33.12 KG/M2

## 2023-09-22 DIAGNOSIS — O13.3 GESTATIONAL HYPERTENSION, THIRD TRIMESTER: ICD-10-CM

## 2023-09-22 DIAGNOSIS — Z3A.36 36 WEEKS GESTATION OF PREGNANCY: Primary | ICD-10-CM

## 2023-09-22 PROCEDURE — 59025 FETAL NON-STRESS TEST: CPT | Performed by: NURSE PRACTITIONER

## 2023-09-22 NOTE — PROGRESS NOTES
[address]: Ms. Navdeep Sweeney was seen today at 36w6d for NST (found under the pregnancy episode) which I reviewed the RN assessment and agree.    Please don't hesitate to contact our office with any concerns or questions.  -DARVIN Parikh

## 2023-09-22 NOTE — PROGRESS NOTES
Repeat Non-Stress Testing:    Patient verbalizes +FM. Pt denies ALL:               Leaking of fluid   Contractions   Vaginal bleeding   Decreased fetal movement    Patient is performing daily kick counts. Patient has no questions or concerns. NST strip reviewed by DARVIN Tesfaye prior to completion of appointment. PT states she has induction 9/23/23.

## 2023-09-23 ENCOUNTER — HOSPITAL ENCOUNTER (INPATIENT)
Facility: HOSPITAL | Age: 28
LOS: 3 days | Discharge: HOME | DRG: 560 | End: 2023-09-26
Attending: OBSTETRICS & GYNECOLOGY | Admitting: OBSTETRICS & GYNECOLOGY
Payer: COMMERCIAL

## 2023-09-23 ENCOUNTER — APPOINTMENT (OUTPATIENT)
Dept: LABOR AND DELIVERY | Facility: HOSPITAL | Age: 28
DRG: 560 | End: 2023-09-23
Payer: COMMERCIAL

## 2023-09-23 PROBLEM — B95.1 POSITIVE GBS TEST: Status: ACTIVE | Noted: 2023-09-23

## 2023-09-23 PROBLEM — Z3A.37 37 WEEKS GESTATION OF PREGNANCY: Status: ACTIVE | Noted: 2023-03-21

## 2023-09-23 LAB
ALBUMIN SERPL BCP-MCNC: 3.3 G/DL (ref 3.5–5)
ALP SERPL-CCNC: 182 U/L (ref 34–104)
ALT SERPL W P-5'-P-CCNC: 5 U/L (ref 7–52)
ANION GAP SERPL CALCULATED.3IONS-SCNC: 12 MMOL/L
AST SERPL W P-5'-P-CCNC: 17 U/L (ref 13–39)
BILIRUB SERPL-MCNC: 0.28 MG/DL (ref 0.2–1)
BUN SERPL-MCNC: 10 MG/DL (ref 5–25)
CALCIUM ALBUM COR SERPL-MCNC: 9.7 MG/DL (ref 8.3–10.1)
CALCIUM SERPL-MCNC: 9.1 MG/DL (ref 8.4–10.2)
CHLORIDE SERPL-SCNC: 105 MMOL/L (ref 96–108)
CO2 SERPL-SCNC: 16 MMOL/L (ref 21–32)
CREAT SERPL-MCNC: 0.67 MG/DL (ref 0.6–1.3)
ERYTHROCYTE [DISTWIDTH] IN BLOOD BY AUTOMATED COUNT: 15.9 % (ref 11.6–15.1)
GFR SERPL CREATININE-BSD FRML MDRD: 121 ML/MIN/1.73SQ M
GLUCOSE SERPL-MCNC: 110 MG/DL (ref 65–140)
HCT VFR BLD AUTO: 30.8 % (ref 34.8–46.1)
HGB BLD-MCNC: 9.6 G/DL (ref 11.5–15.4)
MCH RBC QN AUTO: 23.9 PG (ref 26.8–34.3)
MCHC RBC AUTO-ENTMCNC: 31.2 G/DL (ref 31.4–37.4)
MCV RBC AUTO: 77 FL (ref 82–98)
PLATELET # BLD AUTO: 217 THOUSANDS/UL (ref 149–390)
PMV BLD AUTO: 10.6 FL (ref 8.9–12.7)
POTASSIUM SERPL-SCNC: 3.9 MMOL/L (ref 3.5–5.3)
PROT SERPL-MCNC: 6.3 G/DL (ref 6.4–8.4)
RBC # BLD AUTO: 4.01 MILLION/UL (ref 3.81–5.12)
SODIUM SERPL-SCNC: 133 MMOL/L (ref 135–147)
WBC # BLD AUTO: 8.4 THOUSAND/UL (ref 4.31–10.16)

## 2023-09-23 PROCEDURE — 85027 COMPLETE CBC AUTOMATED: CPT

## 2023-09-23 PROCEDURE — 86850 RBC ANTIBODY SCREEN: CPT

## 2023-09-23 PROCEDURE — 86900 BLOOD TYPING SEROLOGIC ABO: CPT

## 2023-09-23 PROCEDURE — 84156 ASSAY OF PROTEIN URINE: CPT

## 2023-09-23 PROCEDURE — NC001 PR NO CHARGE: Performed by: OBSTETRICS & GYNECOLOGY

## 2023-09-23 PROCEDURE — 86920 COMPATIBILITY TEST SPIN: CPT

## 2023-09-23 PROCEDURE — 86901 BLOOD TYPING SEROLOGIC RH(D): CPT

## 2023-09-23 PROCEDURE — 80053 COMPREHEN METABOLIC PANEL: CPT

## 2023-09-23 PROCEDURE — 82570 ASSAY OF URINE CREATININE: CPT

## 2023-09-23 PROCEDURE — 86780 TREPONEMA PALLIDUM: CPT

## 2023-09-23 RX ORDER — ONDANSETRON 2 MG/ML
4 INJECTION INTRAMUSCULAR; INTRAVENOUS EVERY 6 HOURS PRN
Status: DISCONTINUED | OUTPATIENT
Start: 2023-09-23 | End: 2023-09-25

## 2023-09-23 RX ORDER — SODIUM CHLORIDE, SODIUM LACTATE, POTASSIUM CHLORIDE, CALCIUM CHLORIDE 600; 310; 30; 20 MG/100ML; MG/100ML; MG/100ML; MG/100ML
125 INJECTION, SOLUTION INTRAVENOUS CONTINUOUS
Status: DISCONTINUED | OUTPATIENT
Start: 2023-09-23 | End: 2023-09-25

## 2023-09-23 RX ADMIN — SODIUM CHLORIDE, SODIUM LACTATE, POTASSIUM CHLORIDE, AND CALCIUM CHLORIDE 125 ML/HR: .6; .31; .03; .02 INJECTION, SOLUTION INTRAVENOUS at 23:47

## 2023-09-24 ENCOUNTER — ANESTHESIA EVENT (INPATIENT)
Dept: ANESTHESIOLOGY | Facility: HOSPITAL | Age: 28
DRG: 560 | End: 2023-09-24
Payer: COMMERCIAL

## 2023-09-24 ENCOUNTER — ANESTHESIA (INPATIENT)
Dept: ANESTHESIOLOGY | Facility: HOSPITAL | Age: 28
DRG: 560 | End: 2023-09-24
Payer: COMMERCIAL

## 2023-09-24 PROBLEM — Z87.59 STATUS POST VACUUM-ASSISTED VAGINAL DELIVERY: Status: ACTIVE | Noted: 2023-03-21

## 2023-09-24 PROBLEM — O99.013 ANEMIA AFFECTING PREGNANCY IN THIRD TRIMESTER: Status: ACTIVE | Noted: 2023-09-24

## 2023-09-24 PROBLEM — O14.93 PRE-ECLAMPSIA IN THIRD TRIMESTER: Status: ACTIVE | Noted: 2023-06-09

## 2023-09-24 LAB
ABO GROUP BLD: NORMAL
BASE EXCESS BLDCOA CALC-SCNC: -12.6 MMOL/L (ref 3–11)
BASE EXCESS BLDCOV CALC-SCNC: -10.7 MMOL/L (ref 1–9)
BLD GP AB SCN SERPL QL: NEGATIVE
CREAT UR-MCNC: 121.1 MG/DL
HCO3 BLDCOA-SCNC: 17 MMOL/L (ref 17.3–27.3)
HCO3 BLDCOV-SCNC: 17.6 MMOL/L (ref 12.2–28.6)
HOLD SPECIMEN: NORMAL
O2 CT VFR BLDCOA CALC: 5.3 ML/DL
OXYHGB MFR BLDCOA: 31.4 %
OXYHGB MFR BLDCOV: 16.4 %
PCO2 BLDCOA: 55.7 MM[HG] (ref 30–60)
PCO2 BLDCOV: 48.7 MM HG (ref 27–43)
PH BLDCOA: 7.1 [PH] (ref 7.23–7.43)
PH BLDCOV: 7.17 [PH] (ref 7.19–7.49)
PO2 BLDCOA: 22.4 MM HG (ref 5–25)
PO2 BLDCOV: 15.3 MM HG (ref 15–45)
PROT UR-MCNC: 62 MG/DL
PROT/CREAT UR: 0.51 MG/G{CREAT} (ref 0–0.1)
RH BLD: POSITIVE
SAO2 % BLDCOV: 2.8 ML/DL
SPECIMEN EXPIRATION DATE: NORMAL
TREPONEMA PALLIDUM IGG+IGM AB [PRESENCE] IN SERUM OR PLASMA BY IMMUNOASSAY: NORMAL

## 2023-09-24 PROCEDURE — 82805 BLOOD GASES W/O2 SATURATION: CPT | Performed by: OBSTETRICS & GYNECOLOGY

## 2023-09-24 PROCEDURE — 4A1HXCZ MONITORING OF PRODUCTS OF CONCEPTION, CARDIAC RATE, EXTERNAL APPROACH: ICD-10-PCS | Performed by: STUDENT IN AN ORGANIZED HEALTH CARE EDUCATION/TRAINING PROGRAM

## 2023-09-24 PROCEDURE — 10907ZC DRAINAGE OF AMNIOTIC FLUID, THERAPEUTIC FROM PRODUCTS OF CONCEPTION, VIA NATURAL OR ARTIFICIAL OPENING: ICD-10-PCS | Performed by: STUDENT IN AN ORGANIZED HEALTH CARE EDUCATION/TRAINING PROGRAM

## 2023-09-24 PROCEDURE — 10H07YZ INSERTION OF OTHER DEVICE INTO PRODUCTS OF CONCEPTION, VIA NATURAL OR ARTIFICIAL OPENING: ICD-10-PCS | Performed by: STUDENT IN AN ORGANIZED HEALTH CARE EDUCATION/TRAINING PROGRAM

## 2023-09-24 RX ORDER — CALCIUM CARBONATE 500 MG/1
1000 TABLET, CHEWABLE ORAL 3 TIMES DAILY PRN
Status: DISCONTINUED | OUTPATIENT
Start: 2023-09-24 | End: 2023-09-25

## 2023-09-24 RX ORDER — LABETALOL HYDROCHLORIDE 5 MG/ML
INJECTION, SOLUTION INTRAVENOUS
Status: DISPENSED
Start: 2023-09-24 | End: 2023-09-25

## 2023-09-24 RX ORDER — OXYTOCIN/RINGER'S LACTATE 30/500 ML
1-30 PLASTIC BAG, INJECTION (ML) INTRAVENOUS
Status: DISCONTINUED | OUTPATIENT
Start: 2023-09-24 | End: 2023-09-25

## 2023-09-24 RX ORDER — LIDOCAINE HYDROCHLORIDE AND EPINEPHRINE 15; 5 MG/ML; UG/ML
INJECTION, SOLUTION EPIDURAL AS NEEDED
Status: DISCONTINUED | OUTPATIENT
Start: 2023-09-24 | End: 2023-09-28 | Stop reason: HOSPADM

## 2023-09-24 RX ADMIN — ROPIVACAINE HYDROCHLORIDE: 2 INJECTION, SOLUTION EPIDURAL; INFILTRATION at 17:50

## 2023-09-24 RX ADMIN — PENICILLIN G POTASSIUM 2.5 MILLION UNITS: 20000000 INJECTION, POWDER, FOR SOLUTION INTRAVENOUS at 07:59

## 2023-09-24 RX ADMIN — CALCIUM CARBONATE (ANTACID) CHEW TAB 500 MG 1000 MG: 500 CHEW TAB at 02:54

## 2023-09-24 RX ADMIN — SODIUM CHLORIDE 5 MILLION UNITS: 0.9 INJECTION, SOLUTION INTRAVENOUS at 00:07

## 2023-09-24 RX ADMIN — SODIUM CHLORIDE, SODIUM LACTATE, POTASSIUM CHLORIDE, AND CALCIUM CHLORIDE 125 ML/HR: .6; .31; .03; .02 INJECTION, SOLUTION INTRAVENOUS at 09:26

## 2023-09-24 RX ADMIN — MORPHINE SULFATE 2 MG: 2 INJECTION, SOLUTION INTRAMUSCULAR; INTRAVENOUS at 01:15

## 2023-09-24 RX ADMIN — PENICILLIN G POTASSIUM 2.5 MILLION UNITS: 20000000 INJECTION, POWDER, FOR SOLUTION INTRAVENOUS at 16:03

## 2023-09-24 RX ADMIN — PENICILLIN G POTASSIUM 2.5 MILLION UNITS: 20000000 INJECTION, POWDER, FOR SOLUTION INTRAVENOUS at 04:00

## 2023-09-24 RX ADMIN — LIDOCAINE HYDROCHLORIDE AND EPINEPHRINE 3 ML: 15; 5 INJECTION, SOLUTION EPIDURAL at 17:13

## 2023-09-24 RX ADMIN — PENICILLIN G POTASSIUM 2.5 MILLION UNITS: 20000000 INJECTION, POWDER, FOR SOLUTION INTRAVENOUS at 12:00

## 2023-09-24 RX ADMIN — Medication 2 MILLI-UNITS/MIN: at 03:22

## 2023-09-24 RX ADMIN — SODIUM CHLORIDE, SODIUM LACTATE, POTASSIUM CHLORIDE, AND CALCIUM CHLORIDE 999 ML/HR: .6; .31; .03; .02 INJECTION, SOLUTION INTRAVENOUS at 17:26

## 2023-09-24 RX ADMIN — PENICILLIN G POTASSIUM 2.5 MILLION UNITS: 20000000 INJECTION, POWDER, FOR SOLUTION INTRAVENOUS at 20:00

## 2023-09-24 RX ADMIN — SODIUM CHLORIDE, SODIUM LACTATE, POTASSIUM CHLORIDE, AND CALCIUM CHLORIDE 125 ML/HR: .6; .31; .03; .02 INJECTION, SOLUTION INTRAVENOUS at 23:10

## 2023-09-24 NOTE — OB LABOR/OXYTOCIN SAFETY PROGRESS
Labor Progress Note - Braxton Molina 33 y.o. female MRN: [id number]    Unit/Bed#: -01 Encounter: [id number]       Contraction Frequency (minutes): 3-6  Contraction Quality: Mild  Tachysystole: No   Cervical Dilation: 4        Cervical Effacement: 60  Fetal Station: -3  Baseline Rate: 125 bpm  Fetal Heart Rate: 125 BPM  FHR Category: 1               Vital Signs:   Vitals:    09/24/23 0039   BP:    Pulse:    Resp:    Temp: 98.8 °F (37.1 °C)   SpO2:        Notes/comments:   [place] balloon dislodged. FHT category 1 with contractions every 3-6 min. SVE 4/60/-3. Will start pitocin.     Abigail Guerrier MD 9/24/2023 3:15 AM

## 2023-09-24 NOTE — OB LABOR/OXYTOCIN SAFETY PROGRESS
Oxytocin Safety Progress Check Note - Mendel Libman 33 y.o. female MRN: [id number]    Unit/Bed#: -01 Encounter: [id number]    Dose (myke-units/min) Oxytocin: 26 myke-units/min  Contraction Frequency (minutes): 2-6  Contraction Quality: Mild  Tachysystole: No   Cervical Dilation: 4-5        Cervical Effacement: 60  Fetal Station: -2  Baseline Rate: 120 bpm  Fetal Heart Rate: 115 BPM  FHR Category:  Cat 1               Vital Signs:   Vitals:    09/24/23 1845   BP: 133/70   Pulse: 75   Resp:    Temp:    SpO2:        Notes/comments:     Kaia Junior is doing well and comfortable with her epidural. AROM for clear fluid. All questions answered.         Murali Schmitz DO 9/24/2023 7:11 PM

## 2023-09-24 NOTE — OB LABOR/OXYTOCIN SAFETY PROGRESS
Oxytocin Safety Progress Check Note - Blinddebbie Branch 33 y.o. female MRN: [id number]    Unit/Bed#: -01 Encounter: [id number]    Dose (myke-units/min) Oxytocin: 6 myke-units/min  Contraction Frequency (minutes): 2-5  Contraction Quality: Mild  Tachysystole: No   Cervical Dilation: 4-5        Cervical Effacement: 60  Fetal Station: -3  Baseline Rate: 130 bpm  Fetal Heart Rate: 135 BPM  FHR Category: 2               Vital Signs:   Vitals:    09/24/23 0604   BP: 128/57   Pulse: 67   Resp:    Temp:    SpO2:        Notes/comments:   Patient not feeling contractions. FHT category 1 with contractions every 2-4 min. Pit running at 6, continue titrating to contractions. Fetal head not well applied, and so AROM deferred.   Patient does not yet need her epidural.     Amanda Johnson MD 9/24/2023 6:59 AM

## 2023-09-24 NOTE — OB LABOR/OXYTOCIN SAFETY PROGRESS
Oxytocin Safety Progress Check Note - Lexii Nieto 33 y.o. female MRN: [id number]    Unit/Bed#: -01 Encounter: [id number]    Dose (myke-units/min) Oxytocin: 26 myke-units/min  Contraction Frequency (minutes): 2-3  Contraction Quality: Mild  Tachysystole: No   Cervical Dilation: 4-5        Cervical Effacement: 60  Fetal Station: -3  Baseline Rate: 130 bpm  Fetal Heart Rate: 130 BPM  FHR Category: 1               Vital Signs:   Vitals:    09/24/23 1628   BP: 131/83   Pulse: 75   Resp:    Temp:    SpO2:      Patient comfortable in bed, describes contractions as mild. SVE unchanged as above. FHT category 1, omid q2-3. Plan for AROM at next check, would like epidural beforehand. Dr. Britany Lemos aware.       Breonna Kaufman MD 9/24/2023 4:44 PM

## 2023-09-24 NOTE — OB LABOR/OXYTOCIN SAFETY PROGRESS
Oxytocin Safety Progress Check Note - Layo Ovalles 33 y.o. female MRN: [id number]    Unit/Bed#: -01 Encounter: [id number]    Dose (myke-units/min) Oxytocin: 16 myke-units/min  Contraction Frequency (minutes): 2-4  Contraction Quality: Mild  Tachysystole: No   Cervical Dilation: 4-5        Cervical Effacement: 60  Fetal Station: -3  Baseline Rate: 125 bpm  Fetal Heart Rate: 140 BPM  FHR Category: 1               Vital Signs:   Vitals:    09/24/23 1227   BP: 137/84   Pulse: 70   Resp:    Temp:    SpO2:      Patient resting comfortably in bed, states contractions are mild. SVE difficult exam, patient in pain. Unchanged from previous SVE. FHT category 1, toco q1-3.       Rox Salmeron MD 9/24/2023 12:33 PM

## 2023-09-24 NOTE — OB LABOR/OXYTOCIN SAFETY PROGRESS
Oxytocin Safety Progress Check Note - Ese Amen 33 y.o. female MRN: [id number]    Unit/Bed#: -01 Encounter: [id number]    Dose (myke-units/min) Oxytocin: 18 myke-units/min  Contraction Frequency (minutes): 2.5-2.3  Contraction Quality: Mild  Tachysystole: No   Cervical Dilation: 4-5        Cervical Effacement: 60  Fetal Station: -3  Baseline Rate: 120 bpm  Fetal Heart Rate: 125 BPM  FHR Category: cat i               Vital Signs:   Vitals:    09/24/23 1343   BP: 142/87   Pulse: 70   Resp:    Temp:    SpO2:        Notes/comments:   Feeling uncomfortable with contractions and movement  Very uncomfortable with SVE  Prefers to defer this check  Will check back in 2 hour or so, sooner PRN  Desires PCEA  Discussed recommendation for AROM after PCEA unless water has already broken    Cat I tracing, continue to uptitrate pitocin per protocol    Nichole Chisholm MD 9/24/2023 2:10 PM

## 2023-09-24 NOTE — PLAN OF CARE
Problem: BIRTH - VAGINAL/ SECTION  Goal: Fetal and maternal status remain reassuring during the birth process  Description: INTERVENTIONS:  - Monitor vital signs  - Monitor fetal heart rate  - Monitor uterine activity  - Monitor labor progression (vaginal delivery)  - DVT prophylaxis  - Antibiotic prophylaxis  Outcome: Progressing  Goal: Emotionally satisfying birthing experience for mother/fetus  Description: Interventions:  - Assess, plan, implement and evaluate the nursing care given to the patient in labor  - Advocate the philosophy that each childbirth experience is a unique experience and support the family's chosen level of involvement and control during the labor process   - Actively participate in both the patient's and family's teaching of the birth process  - Consider cultural, Adventist and age-specific factors and plan care for the patient in labor  Outcome: Progressing     Problem: PAIN - ADULT  Goal: Verbalizes/displays adequate comfort level or baseline comfort level  Description: Interventions:  - Encourage patient to monitor pain and request assistance  - Assess pain using appropriate pain scale  - Administer analgesics based on type and severity of pain and evaluate response  - Implement non-pharmacological measures as appropriate and evaluate response  - Consider cultural and social influences on pain and pain management  - Notify physician/advanced practitioner if interventions unsuccessful or patient reports new pain  Outcome: Progressing     Problem: INFECTION - ADULT  Goal: Absence or prevention of progression during hospitalization  Description: INTERVENTIONS:  - Assess and monitor for signs and symptoms of infection  - Monitor lab/diagnostic results  - Monitor all insertion sites, i.e. indwelling lines, tubes, and drains  - Monitor endotracheal if appropriate and nasal secretions for changes in amount and color  - [place] appropriate cooling/warming therapies per order  - Administer medications as ordered  - Instruct and encourage patient and family to use good hand hygiene technique  - Identify and instruct in appropriate isolation precautions for identified infection/condition  Outcome: Progressing  Goal: Absence of fever/infection during neutropenic period  Description: INTERVENTIONS:  - Monitor WBC    Outcome: Progressing     Problem: SAFETY ADULT  Goal: Patient will remain free of falls  Description: INTERVENTIONS:  - Educate patient/family on patient safety including physical limitations  - Instruct patient to call for assistance with activity   - Consult OT/PT to assist with strengthening/mobility   - Keep Call bell within reach  - Keep bed low and locked with side rails adjusted as appropriate  - Keep care items and personal belongings within reach  - Initiate and maintain comfort rounds  - Make Fall Risk Sign visible to staff  - Offer Toileting every  Hours, in advance of need  - Initiate/Maintain alarm  - Obtain necessary fall risk management equipment:   - Apply yellow socks and bracelet for high fall risk patients  - Consider moving patient to room near nurses station  Outcome: Progressing  Goal: Maintain or return to baseline ADL function  Description: INTERVENTIONS:  -  Assess patient's ability to carry out ADLs; assess patient's baseline for ADL function and identify physical deficits which impact ability to perform ADLs (bathing, care of mouth/teeth, toileting, grooming, dressing, etc.)  - Assess/evaluate cause of self-care deficits   - Assess range of motion  - Assess patient's mobility; develop plan if impaired  - Assess patient's need for assistive devices and provide as appropriate  - Encourage maximum independence but intervene and supervise when necessary  - Involve family in performance of ADLs  - Assess for home care needs following discharge   - Consider OT consult to assist with ADL evaluation and planning for discharge  - Provide patient education as appropriate  Outcome: Progressing  Goal: Maintains/Returns to pre admission functional level  Description: INTERVENTIONS:  - Perform BMAT or MOVE assessment daily.   - Set and communicate daily mobility goal to care team and patient/family/caregiver. - Collaborate with rehabilitation services on mobility goals if consulted  - Perform Range of Motion  times a day. - Reposition patient every  hours. - Dangle patient  times a day  - Stand patient  times a day  - Ambulate patient  times a day  - Out of bed to chair  times a day   - Out of bed for meals times a day  - Out of bed for toileting  - Record patient progress and toleration of activity level   Outcome: Progressing     Problem: Knowledge Deficit  Goal: Patient/family/caregiver demonstrates understanding of disease process, treatment plan, medications, and discharge instructions  Description: Complete learning assessment and assess knowledge base.   Interventions:  - Provide teaching at level of understanding  - Provide teaching via preferred learning methods  Outcome: Progressing     Problem: DISCHARGE PLANNING  Goal: Discharge to home or other facility with appropriate resources  Description: INTERVENTIONS:  - Identify barriers to discharge w/patient and caregiver  - Arrange for needed discharge resources and transportation as appropriate  - Identify discharge learning needs (meds, wound care, etc.)  - Arrange for interpretive services to assist at discharge as needed  - Refer to Case Management Department for coordinating discharge planning if the patient needs post-hospital services based on physician/advanced practitioner order or complex needs related to functional status, cognitive ability, or social support system  Outcome: Progressing

## 2023-09-24 NOTE — ASSESSMENT & PLAN NOTE
Fetal monitoring reactive with contractions every 3-5 min on toco.  SVE 1/50/-3. Cephalic on US.     · Admit for IOL for GHTN as scheduled starting with morris balloon  · Clear liquid diet  · Continuous fetal monitoring  · Routine admission labs (CBC, T&S, syphilis panel)  · Analgesia at maternal request  · Contraception: POP bridge to [facility]

## 2023-09-24 NOTE — ANESTHESIA PREPROCEDURE EVALUATION
Procedure:  LABOR ANALGESIA    Relevant Problems   CARDIO   (+) Pre-eclampsia in third trimester      GYN   (+) 37 weeks gestation of pregnancy      HEMATOLOGY   (+) Anemia affecting pregnancy in third trimester        Physical Exam    Airway    Mallampati score: II  TM Distance: >3 FB  Neck ROM: full     Dental       Cardiovascular  Cardiovascular exam normal    Pulmonary  Pulmonary exam normal     Other Findings        Anesthesia Plan  ASA Score- 2     Anesthesia Type- epidural with ASA Monitors. Additional Monitors:   Airway Plan:           Plan Factors-Exercise tolerance (METS): >4 METS. Chart reviewed. Existing labs reviewed. Patient summary reviewed. Induction-     Postoperative Plan-     Informed Consent- Anesthetic plan and risks discussed with patient. I personally reviewed this patient with the CRNA. Discussed and agreed on the Anesthesia Plan with the CRNA. Sheldon Watts             No results found for: "HGBA1C"    Lab Results   Component Value Date    K 3.9 09/23/2023     09/23/2023    CO2 16 (L) 09/23/2023    BUN 10 09/23/2023    CREATININE 0.67 09/23/2023    GLUF 78 06/05/2023    CALCIUM 9.1 09/23/2023    CORRECTEDCA 9.7 09/23/2023    AST 17 09/23/2023    ALT 5 (L) 09/23/2023    ALKPHOS 182 (H) 09/23/2023    EGFR 121 09/23/2023       Lab Results   Component Value Date    WBC 8.40 09/23/2023    HGB 9.6 (L) 09/23/2023    HCT 30.8 (L) 09/23/2023    MCV 77 (L) 09/23/2023     09/23/2023

## 2023-09-24 NOTE — PLAN OF CARE

## 2023-09-24 NOTE — ASSESSMENT & PLAN NOTE
Systolic (52RSY), ARS:283 , Min:139 , FARMER:056   Diastolic (63IKB), KMX:05, Min:74, Max:74  Asymptomatic    · Follow up preeclampsia labs  · Monitor BPs  · Monitor for signs/symptoms of preeclampsia

## 2023-09-24 NOTE — H&P
[address] [facility] [place] 33 y.o. female MRN: [id number]  Unit/Bed#: -01 Encounter: [id number]    Assessment: 33 y.o. Candy Otero at 37w0d admitted for IOL for GHTN. Plan:   * 37 weeks gestation of pregnancy  Assessment & Plan  Fetal monitoring reactive with contractions every 3-5 min on toco.  SVE 50/-3. Cephalic on US. · Admit for IOL for GHTN as scheduled starting with morris balloon  · Clear liquid diet  · Continuous fetal monitoring  · Routine admission labs (CBC, T&S, syphilis panel)  · Analgesia at maternal request  · Contraception: POP bridge to OCP    Positive GBS test  Assessment & Plan  · Penicillin for GBS ppx    Gestational hypertension, third trimester  Assessment & Plan  Systolic (24QXJ), VQL:912 , Min:139 , HA   Diastolic (65IMU), PGJ:53, Min:74, Max:74  Asymptomatic    · Follow up preeclampsia labs  · Monitor BPs  · Monitor for signs/symptoms of preeclampsia    Discussed case and plan w/ Dr. Jose Elias Suresh    Chief Complaint: "I'm here for my induction"    HPI: Jairo Pulliam is a 33 y.o. Candy Otero with an JUMA of 10/14/2023, by Ultrasound at 37w0d who is being admitted for IOL for GHTN. She denies having uterine contractions, has no LOF, and reports no VB. She states she has felt good FM. Patient Active Problem List   Diagnosis   • 37 weeks gestation of pregnancy   • Gestational hypertension, third trimester   • Prenatal care, third trimester       Baby complications/comments: none    Review of Systems   Constitutional: Negative for chills and fever. Eyes: Negative for visual disturbance. Respiratory: Negative for cough and shortness of breath. Cardiovascular: Negative for chest pain and leg swelling. Gastrointestinal: Negative for abdominal pain, diarrhea, nausea and vomiting. Genitourinary: Negative for dysuria, frequency, hematuria, pelvic pain, urgency, vaginal bleeding and vaginal discharge. Neurological: Negative for dizziness, light-headedness and headaches. OB Hx:  OB History    Para Term  AB Living   1 0 0 0 0 0   SAB IAB Ectopic Multiple Live Births   0 0 0 0 0      # Outcome Date GA Lbr Issac/2nd Weight Sex Delivery Anes PTL Lv   1 Current                Past Medical Hx:  Past Medical History:   Diagnosis Date   • Varicella        Past Surgical hx:  Past Surgical History:   Procedure Laterality Date   • WISDOM TOOTH EXTRACTION     • WISDOM TOOTH EXTRACTION         Social Hx:  Alcohol use: no  Tobacco use: no  Other substance use: no    No Known Allergies    Medications Prior to Admission   Medication   • aspirin (ECOTRIN LOW STRENGTH) 81 mg EC tablet   • Blood Pressure KIT   • Blood Pressure Monitoring (RA Blood Pressure Cuff Monitor) JUVE   • Prenatal Vit-Fe Fumarate-FA (Prenatal Vitamin) 27-0.8 MG TABS   • docusate sodium (COLACE) 100 mg capsule   • ferrous sulfate 324 (65 Fe) mg       Objective:    Vitals:  Temp:  [98.7 °F (37.1 °C)] 98.7 °F (37.1 °C)  HR:  [88] 88  Resp:  [18] 18  BP: (139)/(74) 139/74  Body mass index is 33.08 kg/m². Physical Exam:  Physical Exam  Constitutional:       General: She is not in acute distress. Appearance: Normal appearance. She is not ill-appearing. HENT:      Mouth/Throat:      Mouth: Mucous membranes are moist.   Eyes:      Extraocular Movements: Extraocular movements intact. Cardiovascular:      Rate and Rhythm: Normal rate and regular rhythm. Heart sounds: Normal heart sounds. Pulmonary:      Effort: Pulmonary effort is normal.      Breath sounds: Normal breath sounds. Abdominal:      General: There is no distension. Palpations: Abdomen is soft. Tenderness: There is no abdominal tenderness. There is no guarding. Musculoskeletal:         General: No swelling or tenderness. Right lower leg: No edema. Left lower leg: No edema. Neurological:      General: No focal deficit present. Mental Status: She is alert. Skin:     General: Skin is warm and dry. Coloration: Skin is not jaundiced or pale. Psychiatric:         Mood and Affect: Mood normal.         Behavior: Behavior normal.         Thought Content: Thought content normal.         Judgment: Judgment normal.          FHT:  Baseline: 135 bpm  Variability: moderate  Accelerations: present  Decelerations: absent  NST reactive    TOCO:   Contractions every 8-10 min    SVE: 2/62/-4    TAUS: cephalic presentation    PROCEDURE:  MORRIS BALLOON PLACEMENT  A 24 F morris with a 30 mL balloon was selected. A sterile cervical exam was performed, and the cervix was located. The morris balloon was introduced over sterile gloved hands and advanced through the cervix beyond the internal cervical os. A small amount amount of sterile saline solution was instilled into the balloon to confirm placement. Placement was confirmed to be beyond the internal cervical os. A total of 60 mL of sterile saline solution was used to fill the balloon. Patient tolerated the procedure well. Instructions left with nurse to place morris to tension (either by taping the end of the morris catheter to the patient's leg or by attaching a 1 L bag of IV fluid to the end of the morris catheter.) and notify resident and/or attending when morris balloon becomes dislodged.       Lab Results   Component Value Date    WBC 8.40 09/23/2023    HGB 9.6 (L) 09/23/2023    HCT 30.8 (L) 09/23/2023     09/23/2023     Lab Results   Component Value Date    K 3.7 09/20/2023     09/20/2023    CO2 20 (L) 09/20/2023    BUN 6 09/20/2023    CREATININE 0.71 09/20/2023    AST 16 09/20/2023    ALT 8 09/20/2023     Prenatal Labs: Reviewed      Blood type: O positive  Antibody: negative  GBS: positive  HIV: non-reactive  Rubella: immune  Syphilis IgM/IgG: non-reactive  HBsAg: non-reactive  HCAb: non-reactive  Chlamydia: negative  Gonorrhea: negative  Diabetes 1 hour screen: 117  3 hour glucose: not indicated  Platelets: 350  Hgb: 12.1  >2 Midnights  INPATIENT Signature/Title: Kathya Nielsen MD  Date: 9/23/2023  Time: 11:15 PM

## 2023-09-24 NOTE — ANESTHESIA PROCEDURE NOTES
Epidural Block    Patient location during procedure: OB/L&D  Start time: 9/24/2023 5:12 PM  Reason for block: procedure for pain  Staffing  Performed by: Marla Laboy CRNA  Authorized by: Jaime West MD    Preanesthetic Checklist  Completed: patient identified, IV checked, site marked, risks and benefits discussed, surgical consent, monitors and equipment checked, pre-op evaluation and timeout performed  Epidural  Patient position: sitting  Prep: ChloraPrep  Sedation Level: no sedation  Patient monitoring: frequent blood pressure checks, continuous pulse oximetry and heart rate  Approach: midline  Location: lumbar, L2-3  Injection technique: ALLEN saline  Needle  Needle type: Tuohy   Needle gauge: 17 G  Needle insertion depth: 9 cm  Catheter type: multi-orifice  Catheter size: 19 G  Catheter at skin depth: 8 cm  Catheter securement method: stabilization device and clear occlusive dressing  Test dose: negative  Assessment  Sensory level: T4  Number of attempts: 1negative aspiration for CSF, negative aspiration for heme and no paresthesia on injection  patient tolerated the procedure well with no immediate complications

## 2023-09-25 PROCEDURE — 59409 OBSTETRICAL CARE: CPT | Performed by: STUDENT IN AN ORGANIZED HEALTH CARE EDUCATION/TRAINING PROGRAM

## 2023-09-25 PROCEDURE — NC001 PR NO CHARGE: Performed by: OBSTETRICS & GYNECOLOGY

## 2023-09-25 PROCEDURE — 88307 TISSUE EXAM BY PATHOLOGIST: CPT | Performed by: STUDENT IN AN ORGANIZED HEALTH CARE EDUCATION/TRAINING PROGRAM

## 2023-09-25 RX ORDER — CALCIUM CARBONATE 500 MG/1
1000 TABLET, CHEWABLE ORAL DAILY PRN
Status: DISCONTINUED | OUTPATIENT
Start: 2023-09-25 | End: 2023-09-26 | Stop reason: HOSPADM

## 2023-09-25 RX ORDER — SENNOSIDES 8.6 MG
1 TABLET ORAL DAILY
Status: DISCONTINUED | OUTPATIENT
Start: 2023-09-25 | End: 2023-09-26 | Stop reason: HOSPADM

## 2023-09-25 RX ORDER — IBUPROFEN 600 MG/1
600 TABLET ORAL EVERY 6 HOURS
Status: DISCONTINUED | OUTPATIENT
Start: 2023-09-25 | End: 2023-09-26 | Stop reason: HOSPADM

## 2023-09-25 RX ORDER — OXYTOCIN/RINGER'S LACTATE 30/500 ML
250 PLASTIC BAG, INJECTION (ML) INTRAVENOUS ONCE
Status: DISCONTINUED | OUTPATIENT
Start: 2023-09-25 | End: 2023-09-26 | Stop reason: HOSPADM

## 2023-09-25 RX ORDER — SIMETHICONE 80 MG
80 TABLET,CHEWABLE ORAL 4 TIMES DAILY PRN
Status: DISCONTINUED | OUTPATIENT
Start: 2023-09-25 | End: 2023-09-26 | Stop reason: HOSPADM

## 2023-09-25 RX ORDER — ACETAMINOPHEN 325 MG/1
650 TABLET ORAL EVERY 4 HOURS PRN
Status: DISCONTINUED | OUTPATIENT
Start: 2023-09-25 | End: 2023-09-26 | Stop reason: HOSPADM

## 2023-09-25 RX ORDER — DIAPER,BRIEF,INFANT-TODD,DISP
1 EACH MISCELLANEOUS DAILY PRN
Status: DISCONTINUED | OUTPATIENT
Start: 2023-09-25 | End: 2023-09-26 | Stop reason: HOSPADM

## 2023-09-25 RX ORDER — DOCUSATE SODIUM 100 MG/1
100 CAPSULE, LIQUID FILLED ORAL 2 TIMES DAILY
Status: DISCONTINUED | OUTPATIENT
Start: 2023-09-25 | End: 2023-09-26 | Stop reason: HOSPADM

## 2023-09-25 RX ORDER — DIPHENHYDRAMINE HCL 25 MG
25 TABLET ORAL EVERY 6 HOURS PRN
Status: DISCONTINUED | OUTPATIENT
Start: 2023-09-25 | End: 2023-09-26 | Stop reason: HOSPADM

## 2023-09-25 RX ORDER — ONDANSETRON 2 MG/ML
4 INJECTION INTRAMUSCULAR; INTRAVENOUS EVERY 8 HOURS PRN
Status: DISCONTINUED | OUTPATIENT
Start: 2023-09-25 | End: 2023-09-26 | Stop reason: HOSPADM

## 2023-09-25 RX ADMIN — HYDROCORTISONE 1 APPLICATION: 1 CREAM TOPICAL at 00:44

## 2023-09-25 RX ADMIN — ACETAMINOPHEN 650 MG: 325 TABLET, FILM COATED ORAL at 13:00

## 2023-09-25 RX ADMIN — IBUPROFEN 600 MG: 600 TABLET ORAL at 00:44

## 2023-09-25 RX ADMIN — IBUPROFEN 600 MG: 600 TABLET ORAL at 19:35

## 2023-09-25 RX ADMIN — ACETAMINOPHEN 650 MG: 325 TABLET, FILM COATED ORAL at 01:35

## 2023-09-25 RX ADMIN — IBUPROFEN 600 MG: 600 TABLET ORAL at 09:16

## 2023-09-25 RX ADMIN — DOCUSATE SODIUM 100 MG: 100 CAPSULE, LIQUID FILLED ORAL at 19:35

## 2023-09-25 RX ADMIN — DOCUSATE SODIUM 100 MG: 100 CAPSULE, LIQUID FILLED ORAL at 09:16

## 2023-09-25 RX ADMIN — BENZOCAINE AND LEVOMENTHOL 1 APPLICATION: 200; 5 SPRAY TOPICAL at 00:44

## 2023-09-25 RX ADMIN — ACETAMINOPHEN 650 MG: 325 TABLET, FILM COATED ORAL at 06:11

## 2023-09-25 RX ADMIN — WITCH HAZEL 1 PAD: 500 SOLUTION RECTAL; TOPICAL at 00:44

## 2023-09-25 NOTE — DISCHARGE SUMMARY
Discharge Summary - Dimas Patel 33 y.o. female MRN: [id number]    Unit/Bed#: -01 Encounter: [id number]    Admission Date: 2023     Discharge Date: ***    Patient Active Problem List   Diagnosis   • Status post vacuum-assisted vaginal delivery   • Pre-eclampsia in third trimester   • Prenatal care, third trimester   • Positive GBS test   • Anemia affecting pregnancy in third trimester       OBGYN Practice: {[facility] Practices:19158}    Hospital Course:   Dimas Patel is a 33 y.o. Stacey Mince who was admitted at 43w1d for ***. ***      Delivery Findings:  Jennifer Dutta delivered a viable {Desc; male/female:30562}  on 2023  11:23 PM  via   . The delivery was uncomplicated***    Baby's Weight:  ;      Apgar scores: 6  and 8  at 1 and 5 minutes, respectively  Anesthesia:  ,   QBL:           was transferred to *** nursery. Patient tolerated the procedure well and was transferred to recovery in stable condition. Her post-partum course was uncomplicated***. Her post-partum pain was well controlled with oral analgesics. On day of discharge she was ambulating, voiding spontaneously, tolerating oral intake and hemodynamically stable. [facility]'s blood type is [facility] and  Rhogam {WAS/WAS NOT:[id number]::"was not"} given. She was discharged home on postpartum day #*** without complications. Patient was instructed to follow up with her OB as an outpatient and was given appropriate warnings to call doctor or provider if she develops signs of infection or uncontrolled pain. Discharge Problem List by Issue:   No new Assessment & Plan notes have been filed under this hospital service since the last note was generated. Service: OB/GYN       Disposition: Home    Planned Readmission: No    Discharge Medications:   Please see AVS    Discharge instructions :   -Do not place anything (no partner, tampons or douche) in your vagina for 6 weeks.   -You may walk for exercise for the first 6 weeks then gradually return to your usual activities.   -Please do not drive for 1 week if you have no stitches and for 2 weeks if you have stitches or underwent a  delivery.    -You may take baths or shower per your preference.   -Please look at your bust (breasts) in the mirror daily and call your doctor for redness or tenderness or increased warmth. - If you have had a  section please look at your incision daily as well and call provider for increasing redness or steady drainage from the incision.   -Please call your doctor's office if temperature > 100.4*F or 38* C, worsening pain or a foul discharge.     Follow Up:  - Follow up in *** weeks for postpartum visit    ***

## 2023-09-25 NOTE — PLAN OF CARE
Problem: PAIN - ADULT  Goal: Verbalizes/displays adequate comfort level or baseline comfort level  Description: Interventions:  - Encourage patient to monitor pain and request assistance  - Assess pain using appropriate pain scale  - Administer analgesics based on type and severity of pain and evaluate response  - Implement non-pharmacological measures as appropriate and evaluate response  - Consider cultural and social influences on pain and pain management  - Notify physician/advanced practitioner if interventions unsuccessful or patient reports new pain  Outcome: Progressing     Problem: INFECTION - ADULT  Goal: Absence or prevention of progression during hospitalization  Description: INTERVENTIONS:  - Assess and monitor for signs and symptoms of infection  - Monitor lab/diagnostic results  - Monitor all insertion sites, i.e. indwelling lines, tubes, and drains  - Monitor endotracheal if appropriate and nasal secretions for changes in amount and color  - [place] appropriate cooling/warming therapies per order  - Administer medications as ordered  - Instruct and encourage patient and family to use good hand hygiene technique  - Identify and instruct in appropriate isolation precautions for identified infection/condition  Outcome: Progressing  Goal: Absence of fever/infection during neutropenic period  Description: INTERVENTIONS:  - Monitor WBC    Outcome: Progressing     Problem: SAFETY ADULT  Goal: Patient will remain free of falls  Description: INTERVENTIONS:  - Educate patient/family on patient safety including physical limitations  - Instruct patient to call for assistance with activity   - Consult OT/PT to assist with strengthening/mobility   - Keep Call bell within reach  - Keep bed low and locked with side rails adjusted as appropriate  - Keep care items and personal belongings within reach  - Initiate and maintain comfort rounds  - Make Fall Risk Sign visible to staff  - Offer Toileting every  Hours, in advance of need  - Initiate/Maintain alarm  - Obtain necessary fall risk management equipment:   - Apply yellow socks and bracelet for high fall risk patients  - Consider moving patient to room near nurses station  Outcome: Progressing  Goal: Maintain or return to baseline ADL function  Description: INTERVENTIONS:  -  Assess patient's ability to carry out ADLs; assess patient's baseline for ADL function and identify physical deficits which impact ability to perform ADLs (bathing, care of mouth/teeth, toileting, grooming, dressing, etc.)  - Assess/evaluate cause of self-care deficits   - Assess range of motion  - Assess patient's mobility; develop plan if impaired  - Assess patient's need for assistive devices and provide as appropriate  - Encourage maximum independence but intervene and supervise when necessary  - Involve family in performance of ADLs  - Assess for home care needs following discharge   - Consider OT consult to assist with ADL evaluation and planning for discharge  - Provide patient education as appropriate  Outcome: Progressing  Goal: Maintains/Returns to pre admission functional level  Description: INTERVENTIONS:  - Perform BMAT or MOVE assessment daily.   - Set and communicate daily mobility goal to care team and patient/family/caregiver. - Collaborate with rehabilitation services on mobility goals if consulted  - Perform Range of Motion  times a day. - Reposition patient every  hours.   - Dangle patient  times a day  - Stand patient  times a day  - Ambulate patient  times a day  - Out of bed to chair  times a day   - Out of bed for meals  times a day  - Out of bed for toileting  - Record patient progress and toleration of activity level   Outcome: Progressing     Problem: Knowledge Deficit  Goal: Patient/family/caregiver demonstrates understanding of disease process, treatment plan, medications, and discharge instructions  Description: Complete learning assessment and assess knowledge base.  Interventions:  - Provide teaching at level of understanding  - Provide teaching via preferred learning methods  Outcome: Progressing     Problem: DISCHARGE PLANNING  Goal: Discharge to home or other facility with appropriate resources  Description: INTERVENTIONS:  - Identify barriers to discharge w/patient and caregiver  - Arrange for needed discharge resources and transportation as appropriate  - Identify discharge learning needs (meds, wound care, etc.)  - Arrange for interpretive services to assist at discharge as needed  - Refer to Case Management Department for coordinating discharge planning if the patient needs post-hospital services based on physician/advanced practitioner order or complex needs related to functional status, cognitive ability, or social support system  Outcome: Progressing     Problem: POSTPARTUM  Goal: Experiences normal postpartum course  Description: INTERVENTIONS:  - Monitor maternal vital signs  - Assess uterine involution and lochia  Outcome: Progressing  Goal: Appropriate maternal -  bonding  Description: INTERVENTIONS:  - Identify family support  - Assess for appropriate maternal/infant bonding   -Encourage maternal/infant bonding opportunities  - Referral to  or  as needed  Outcome: Progressing  Goal: Establishment of infant feeding pattern  Description: INTERVENTIONS:  - Assess breast/bottle feeding  - Refer to lactation as needed  Outcome: Progressing  Goal: Incision(s), wounds(s) or drain site(s) healing without S/S of infection  Description: INTERVENTIONS  - Assess and document dressing, incision, wound bed, drain sites and surrounding tissue  - Provide patient and family education  - Perform skin care/dressing changes every   Outcome: Progressing     Problem: BIRTH - VAGINAL/ SECTION  Goal: Fetal and maternal status remain reassuring during the birth process  Description: INTERVENTIONS:  - Monitor vital signs  - Monitor fetal heart rate  - Monitor uterine activity  - Monitor labor progression (vaginal delivery)  - DVT prophylaxis  - Antibiotic prophylaxis  Outcome: Completed  Goal: Emotionally satisfying birthing experience for mother/fetus  Description: Interventions:  - Assess, plan, implement and evaluate the nursing care given to the patient in labor  - Advocate the philosophy that each childbirth experience is a unique experience and support the family's chosen level of involvement and control during the labor process   - Actively participate in both the patient's and family's teaching of the birth process  - Consider cultural, Judaism and age-specific factors and plan care for the patient in labor  Outcome: Completed

## 2023-09-25 NOTE — OB LABOR/OXYTOCIN SAFETY PROGRESS
Oxytocin Safety Progress Check Note - Stanislav Griffin 33 y.o. female MRN: [id number]    Unit/Bed#: -01 Encounter: [id number]    Dose (myke-units/min) Oxytocin: 22 myke-units/min (titrated down 4 mu per dr Kristy Shanks)  Contraction Frequency (minutes): 2-3  Contraction Quality: Mild  Tachysystole: No   Cervical Dilation: 4-5        Cervical Effacement: 60  Fetal Station: -2  Baseline Rate: 135 bpm  Fetal Heart Rate: 120 BPM  FHR Category: 1               Vital Signs:   Vitals:    09/24/23 1845   BP: 133/70   Pulse: 75   Resp:    Temp:    SpO2:        Notes/comments:     Patient is doing well, strip with minimal variability, she has been on her left side for about 30 minutes, repositioned onto right side with peanut ball and moderate variability and spontaneous accelerations were noted.         Petar Carpenter DO 9/24/2023 8:46 PM

## 2023-09-25 NOTE — PROGRESS NOTES
Progress Note - OB/GYN  Gerson Loyd 33 y.o. female MRN: [id number]  Unit/Bed#: -01 Encounter: [id number]    Assessment and Plan     Gerson Loyd is a patient of: [address] [place] [address]. She is PPD# 1 s/p  VAVD  Recovering well and is stable       Pre-eclampsia in third trimester  Assessment & Plan  Systolic (18IYV), FIT:932 , Min:110 , ZAL:688   Diastolic (11PMC), HOZ:18, Min:62, Max:85    CBC, CMP wnl, PC 0.51    * Status post vacuum-assisted vaginal delivery  Assessment & Plan  Lochia WNL   Recovering well   Appropriate bowel and bladder function   Pain well controlled   Tolerating diet   Breastfeeding: not yet   Ambulating without issues   No lower extremity tenderness  Baby in  NICU  Contra: POPs to OCPs  Strep Grp B PCR (no units)   Date Value   09/13/2023 Positive (A)     Rh Factor (no units)   Date Value   09/23/2023 Positive             Disposition    - Anticipate discharge home on PPD# 2      Subjective/Objective     Chief Complaint: Postpartum State     Subjective:    Gerson Loyd is PPD/POD#1 s/p  spontaneous vaginal delivery. She has no current complaints. Pain is well controlled. Patient is currently voiding. She is ambulating. Patient is currently passing flatus and has had no bowel movement. She is tolerating PO, and denies nausea or vomitting. Patient denies fever, chills, chest pain, shortness of breath, or calf tenderness. Lochia is minimal. She is  Not yet breastfeeding. She is recovering well and is stable.        Vitals:   /63 (BP Location: Right arm)   Pulse 70   Temp 98.5 °F (36.9 °C) (Oral)   Resp 18   Ht 5' 5" (1.651 m)   Wt 90.2 kg (198 lb 12.8 oz)   LMP 01/02/2023   SpO2 100%   Breastfeeding Unknown   BMI 33.08 kg/m²       Intake/Output Summary (Last 24 hours) at 9/25/2023 0653  Last data filed at 9/25/2023 0500  Gross per 24 hour   Intake 3617.95 ml   Output 1550 ml   Net 2067.95 ml       Invasive Devices     Peripheral Intravenous Line Duration           Peripheral IV 09/23/23 Dorsal (posterior); Left Hand 1 day          Epidural Line  Duration           Epidural Catheter 09/24/23 <1 day                Physical Exam:   GEN: Moriah Vides appears well, alert and oriented x 3, pleasant and cooperative   CARDIO: RRR, no murmurs or rubs  RESP:  CTAB, no wheezes or rales  ABDOMEN: soft, no tenderness, no distention, fundus @ U-2  EXTREMITIES: SCDs on, non tender, no erythema, b/l Jade's sign negative      Labs:     Hemoglobin   Date Value Ref Range Status   09/23/2023 9.6 (L) 11.5 - 15.4 g/dL Final   09/20/2023 9.5 (L) 11.5 - 15.4 g/dL Final     WBC   Date Value Ref Range Status   09/23/2023 8.40 4.31 - 10.16 Thousand/uL Final   09/20/2023 9.56 4.31 - 10.16 Thousand/uL Final     Platelets   Date Value Ref Range Status   09/23/2023 217 149 - 390 Thousands/uL Final   09/20/2023 209 149 - 390 Thousands/uL Final     Creatinine   Date Value Ref Range Status   09/23/2023 0.67 0.60 - 1.30 mg/dL Final     Comment:     Standardized to IDMS reference method   09/20/2023 0.71 0.60 - 1.30 mg/dL Final     Comment:     Standardized to IDMS reference method     AST   Date Value Ref Range Status   09/23/2023 17 13 - 39 U/L Final   09/20/2023 16 13 - 39 U/L Final     ALT   Date Value Ref Range Status   09/23/2023 5 (L) 7 - 52 U/L Final     Comment:     Specimen collection should occur prior to Sulfasalazine administration due to the potential for falsely depressed results. 09/20/2023 8 7 - 52 U/L Final     Comment:     Specimen collection should occur prior to Sulfasalazine administration due to the potential for falsely depressed results.            Neal Lane MD  9/25/2023  6:53 AM

## 2023-09-25 NOTE — OB LABOR/OXYTOCIN SAFETY PROGRESS
Oxytocin Safety Progress Check Note - Samantha Mcintosh 33 y.o. female MRN: [id number]    Unit/Bed#: -01 Encounter: [id number]    Dose (myke-units/min) Oxytocin: 24 myke-units/min  Contraction Frequency (minutes): 2-4  Contraction Quality: Mild  Tachysystole: No   Cervical Dilation: 10  Dilation Complete Date: 09/24/23  Dilation Complete Time: 2301  Cervical Effacement: 100  Fetal Station: 2  Baseline Rate: 140 bpm  Fetal Heart Rate: 140 BPM  FHR Category: 2               Vital Signs:   Vitals:    09/24/23 2100   BP: 130/64   Pulse: 77   Resp:    Temp: 98.5 °F (36.9 °C)   SpO2:        Notes/comments:     Fully dilated. Needs some coaching with pushing, but pushes well. Deceleration after first push.          Claudia Barajas DO 9/24/2023 11:01 PM

## 2023-09-25 NOTE — DISCHARGE INSTRUCTIONS
Self Care After Delivery   AMBULATORY CARE:   The postpartum period is the period of time from delivery to about 6 weeks. During this time you may experience many physical and emotional changes. It is important to understand what is normal and when you need to call your healthcare provider. It is also important to know how to care for yourself during this time. Call your local emergency number (911 in the [address]) for any of the following: You see or hear things that are not there, or have thoughts of harming yourself or your baby. You soak through 1 pad in 15 minutes, have blurry vision, clammy or pale skin, and feel faint. You faint or lose consciousness. You have trouble breathing. You cough up blood. Your  incision comes apart. Seek care immediately if:   Your heart is beating faster than usual.     You have a bad headache or changes in your vision. Your perineal tear, episiotomy site, or  incision is red, swollen, bleeding, or draining pus. You have severe abdominal pain. Call your doctor or obstetrician if:   Your leg is painful, red, and larger than usual.     You soak through 1 or more pads in an hour, or pass blood clots larger than a quarter from your vagina. You have a fever. You have new or worsening pain in your abdomen or vagina. You continue to have depression 1 to 2 weeks after you deliver. You have trouble sleeping. You have foul-smelling discharge from your vagina. You have pain or burning when you urinate. You do not have a bowel movement for 3 days or more. You have nausea or are vomiting. You have hard lumps or red streaks over your breasts. You have cracked nipples or bleed from your nipples. You have questions or concerns about your condition or care. Physical changes:  The following are normal changes after you give birth:  Pain in the area between your anus and vagina     Breast pain Constipation or hemorrhoids     Hot or cold flashes     Vaginal bleeding or discharge     Mild to moderate abdominal cramping     Difficulty controlling bowel movements or urine     Emotional changes: A drop in hormone levels after you deliver may cause changes in your emotions. You may feel irritable, sad, or anxious. You may cry easily or for no reason. You may also feel depressed. Depression that continues can be a sign of postpartum depression, a condition that can be treated. Treatment may include talk therapy, medicines, or both. Healthcare providers will ask how you are feeling and if you have any depression. These talks can happen during appointments for your medical care and for your baby's care, such as well child visits. Providers can help you find ways to care for yourself and your baby. Talk to your providers about the following:  When emotional changes or depression started, and if it is getting worse over time     Problems you are having with daily activities, sleep, or caring for your baby     If anything makes you feel worse, or makes you feel better     Feeling that you are not bonding with your baby the way you want     Any problems your baby has with sleeping or feeding     Your baby is fussy or cries a lot     Support you have from friends, family, or others     Breast care for breastfeeding mothers: You may have sore breasts for 3 to 6 days after you give birth. This happens as your milk begins to fill your breasts. You may also have sore breasts if you do not breastfeed frequently. Do the following to care for your breasts:  Apply a moist, warm, compress to your breast as directed. This may help soothe your breasts. Make sure the washcloth is not too hot before you apply it to your breast.     Nurse your baby or pump your milk frequently. This may prevent clogged milk ducts. Ask your healthcare provider how often to nurse or pump. Massage your breasts as directed.  This may help increase your milk flow. Gently rub your breasts in a circular motion before you breastfeed. You may need to gently squeeze your breast or nipple to help release milk. You can also use a breast pump to help release milk from your breast.     Wash your breasts with warm water only. Do not put soap on your nipples. Soap may cause your nipples to become dry. Apply lanolin cream to your nipples as directed. Lanolin cream may add moisture to your skin and prevent nipple dryness. Always wash off lanolin cream with warm water before you breastfeed. Place pads in your bra. Your nipples may leak milk when you are not breastfeeding. You can place pads inside of your bra to help prevent leaking onto your clothing. Ask your healthcare provider where to purchase bra pads. Get breastfeeding support if needed. Healthcare providers can answer questions about breastfeeding and provide you with support. Ask your healthcare provider who you can contact if you need breastfeeding support. Breast care for non-breastfeeding mothers: Milk will fill your breasts even if you bottle feed your baby. Do the following to help stop your milk from filling your breasts and causing pain:  Wear a bra with support at all times. A sports bra or a tight-fitting bra will help stop your milk from coming in. Apply ice on each breast for 15 to 20 minutes every hour or as directed. Use an ice pack, or put crushed ice in a plastic bag. Cover it with a towel before you apply it to your breast. Ice helps your milk ducts shrink. Keep your breasts away from warm water. Warm water will make it easier for milk to fill your breasts. Stand with your breasts away from warm water in the shower. Limit how much you touch your breasts. This will prevent them from filling with milk. Perineum care: Your perineum is the area between your rectum and vagina. It is normal to have swelling and pain in this area after you give birth.  If you had an episiotomy, your healthcare provider may give you special instructions. Clean your perineum after you use the bathroom. This may prevent infection and help with healing. Use a spray bottle with warm water to clean your perineum. You may also gently spray warm water against your perineum when you urinate. Always wipe front to back. Take a sitz bath as directed. A sitz bath may help relieve swelling and pain. Fill your bath tub or bucket with water up to your hips and sit in the water. Use cold water for 2 days after you deliver. Then use warm water. Ask your healthcare provider for more information about a sitz bath. Apply ice packs for the first 24 hours or as directed. Use a plastic glove filled with ice or buy an ice pack. Wrap the ice pack or plastic glove in a small towel or wash cloth. Place the ice pack on your perineum for 20 minutes at a time. Sit on a donut-shaped pillow. This may relieve pressure on your perineum when you sit. Use wipes that contain medicine or take pills as directed. Your healthcare provider may tell you to use witch hazel pads. You can place witch hazel pads in the refrigerator before you apply them to your perineum. Your provider may also tell you to take NSAIDs. Ask him or her how often to take pills or use the wipes. Do not go swimming or take tub baths for 4 to 6 weeks or as directed. This will help prevent an infection in your vagina or uterus. Bowel and bladder care: It may take 3 to 5 days to have a bowel movement after you deliver your baby. You can do the following to prevent or manage constipation, and get control of your bowel or bladder:  Take stool softeners as directed. A stool softener is medicine that will make your bowel movements softer. This may prevent or relieve constipation. A stool softener may also make bowel movements less painful. Drink plenty of liquids. Ask how much liquid to drink each day and which liquids are best for you. Liquids may help prevent constipation. Eat foods high in fiber. Examples include fruits, vegetables, grains, beans, and lentils. Ask your healthcare provider how much fiber you need each day. Fiber may prevent constipation. Do Kegel exercises as directed. Kegel exercises will help strengthen the muscles that control bowel movements and urination. Ask your healthcare provider for more information on Kegel exercises. Apply cold compresses or medicine to hemorrhoids as directed. This may relieve swelling and pain. Your healthcare provider may tell you to apply ice or wipes that contain medicine to your hemorrhoids. He or she may also tell you to use a sitz bath. Ask your provider for more information on how to manage hemorrhoids. Nutrition: Good nutrition is important in the postpartum period. It will help you return to a healthy weight, increase your energy levels, and prevent constipation. It will also help you get enough nutrients and calories if you are going to breastfeed your baby. Eat a variety of healthy foods. Healthy foods include fruits, vegetables, whole-grain breads, low-fat dairy products, beans, lean meats, and fish. You may need 500 to 700 extra calories each day if you breastfeed your baby. You may also need extra protein. Limit foods with added sugar and high amounts of fat. These foods are high in calories and low in healthy nutrients. Read food labels so you know how much sugar and fat is in the food you want to eat. Drink 8 to 10 glasses of water per day. Water will help you make plenty of milk for your baby. It will also help prevent constipation. Drink a glass of water every time you breastfeed your baby. Take vitamins as directed. Ask your healthcare provider what vitamins you need. Limit caffeine and alcohol if you are breastfeeding. Caffeine and alcohol can get into your breast milk. Caffeine and alcohol can make your baby fussy.  They can also interfere with your baby's sleep. Ask your healthcare provider if you can drink alcohol or caffeine. Rest and sleep: You may feel very tired in the postpartum period. Enough sleep will help you heal and give you energy to care for your baby. The following may help you get sleep and rest:  Nap when your baby naps. Your baby may nap several times during the day. Get rest during this time. Limit visitors. Many people may want to see you and your baby. Ask friends or family to visit on different days. This will give you time to rest.     Do not plan too much for one day. Put off household chores so that you have time to rest. Gradually do more each day. Ask for help from family, friends, or neighbors. Ask them to help you with laundry, cleaning, or errands. Also ask someone to watch the baby while you take a nap or relax. Ask your partner to help with the care of your baby. Pump some of your breast milk so your partner can feed your baby during the night. Exercise after delivery: Wait until your healthcare provider says it is okay to exercise. Exercise can help you lose weight, increase your energy levels, and manage your mood. It can also prevent constipation and blood clots. Start with gentle exercises such as walking. Do more as you have more energy. You may need to avoid abdominal exercises for 1 to 2 weeks after you deliver. Talk to your healthcare provider about an exercise plan that is right for you. Sexual activity after delivery:   Do not have sex until your healthcare provider says it is okay. You may need to wait 4 to 6 weeks before you have sex. This may prevent infection and allow time to heal.     Your menstrual cycle may begin as soon as 3 weeks after you deliver. Your period may be delayed if you breastfeed your baby. You can become pregnant before you get your first postpartum period. Talk to your healthcare provider about birth control that is right for you.  Some types of birth control are not safe during breastfeeding. For support and more information: Join a support group for new mothers. Ask for help from family and friends with chores, errands, and care of your baby. Office of Beverly Durham,  Department of Health and Human Services  [address], [address] [facility] , [email or web address]. [facility]  [address], [address] [facility] , [address]  Phone: [phone]  Web Address: www.womenshealth.gov  [facility] Postpartum [address] [facility] , [address]  [address] , [address]  Web Address: [employer]. org/pregnancy/postpartum-care. aspx  Follow up with your doctor or obstetrician as directed: You will need to follow up within 2 to 6 weeks of delivery. Write down your questions so you remember to ask them at your visits. © Copyright [employer] Information is for End User's use only and may not be sold, redistributed or otherwise used for commercial purposes. All illustrations and images included in CareNotes® are the copyrighted property of [employer]D.A[employer]M., Inc. or [address] [place] [address]  The above information is an  only. It is not intended as medical advice for individual conditions or treatments. Talk to your doctor, nurse or pharmacist before following any medical regimen to see if it is safe and effective for you.

## 2023-09-25 NOTE — ASSESSMENT & PLAN NOTE
Lochia WNL   Recovering well   Appropriate bowel and bladder function   Pain well controlled   Tolerating diet   Breastfeeding: not yet   Ambulating without issues   No lower extremity tenderness  Baby in  NICU  Contra: POPs to OCPs  Strep Grp B PCR (no units)   Date Value   09/13/2023 Positive (A)     Rh Factor (no units)   Date Value   09/23/2023 Positive

## 2023-09-25 NOTE — DISCHARGE SUMMARY
Discharge Summary - Valeria Stratton 33 y.o. female MRN: [id number]    Unit/Bed#: -01 Encounter: [id number]    Admission Date: 2023     Discharge Date: 23    Patient Active Problem List   Diagnosis   • Status post vacuum-assisted vaginal delivery   • Pre-eclampsia in third trimester   • Prenatal care, third trimester   • Positive GBS test   • Anemia affecting pregnancy in third trimester       OBGYN Practice: [address] [facility] Course:   Valeria Stratton is a 33 y.o. Valeri Carrier who was admitted at 43w1d for Induction of Labor due to preeclampsia without severe features. She was found to be GBS positive and was given penicillin prior to AROM. Pitocin was given to induce labor as well as amniotomy for ROM. Pt with a normal CBC and CMP with UP:C found to be 0.51 as well as anemia of 9.6. Patient had vacuum assisted delivery due to recurrent late decelerations following pushing for 20 minutes. Pt sustained right vaginal, periurethral and 2nd degree perineal lacerations during delivery which were sutured. Baby was transferred to NICU for respiratory distress and possible  sepsis. Delivery Findings:  Lien Kim delivered a viable female  on 2023  11:23 PM  via Vaginal, Vacuum (Extractor)  . Baby's Weight:  ;7 lbs. Apgar scores: 6  and 8  at 1 and 5 minutes, respectively  Anesthesia: Epidural ,   QBL:  100         was transferred to NICU. Patient tolerated the procedure well and was transferred to recovery in stable condition. Her post-partum course was uncomplicated. Her post-partum pain was well controlled with oral analgesics. On day of discharge she was ambulating, voiding spontaneously, tolerating oral intake and hemodynamically stable. Mom's blood type is O positive and  Rhogam was not given. She was discharged home on postpartum day #2 without complications.  Patient was instructed to follow up with her OB as an outpatient and was given appropriate warnings to call doctor or provider if she develops signs of infection or uncontrolled pain. Discharge Problem List by Issue:   No new Assessment & Plan notes have been filed under this hospital service since the last note was generated. Service: OB/GYN       Disposition: Home    Planned Readmission: No    Discharge Medications:   Please see AVS    Discharge instructions :   -Do not place anything (no partner, tampons or douche) in your vagina for 6 weeks. -You may walk for exercise for the first 6 weeks then gradually return to your usual activities.   -Please do not drive for 1 week if you have no stitches and for 2 weeks if you have stitches or underwent a  delivery.    -You may take baths or shower per your preference.   -Please look at your bust (breasts) in the mirror daily and call your doctor for redness or tenderness or increased warmth. - If you have had a  section please look at your incision daily as well and call provider for increasing redness or steady drainage from the incision.   -Please call your doctor's office if temperature > 100.4*F or 38* C, worsening pain or a foul discharge.     Follow Up:  - Follow up in 3 weeks for postpartum visit    [address] [place] [address] PGY2

## 2023-09-25 NOTE — ASSESSMENT & PLAN NOTE
Systolic (45NJL), A , Min:133 , PZD:807   Diastolic (22QDB), BIH:41, Min:81, Max:88    CBC, CMP wnl, PC 0.51

## 2023-09-25 NOTE — LACTATION NOTE
This note was copied from a baby's chart. CONSULT - LACTATION  Baby Girl (Michael Samayoa) Coachman 1 days female MRN: [id number]    [facility] NICU Room / Bed: NICU /NICU  Encounter: [id number]    Maternal Information     MOTHER:  Sade ALAS  Maternal Age: 33 y.o.   OB History: # 1 - Date: 23, Sex: Female, Weight: None, GA: 37w1d, Delivery: Vaginal, Vacuum (Extractor), Apgar1: 6, Apgar5: 8, Living: Living, Birth Comments: None   Previouse breast reduction surgery? No    Lactation history:   Has patient previously breast fed: How long had patient previously breast fed:     Previous breast feeding complications:       Past Surgical History:   Procedure Laterality Date   • WISDOM TOOTH EXTRACTION     • WISDOM TOOTH EXTRACTION          Birth information:  YOB: 2022   Time of birth: 11:23 PM   Sex: female   Delivery type: Vaginal, Vacuum (Extractor)   Birth Weight: 2860 g (6 lb 4.9 oz)   Percent of Weight Change: 0%     Gestational Age: 43w4d   [unfilled]    Assessment     Breast and nipple assessment: large breast previously had nipple piercing but does not have in. [place] Assessment: baby in nicu       23 1020   Lactation Consultation   Reason for Consult 20;15 min;10 minute   Maternal Information   Has mother  before?  No   Breasts/Nipples   Date Pumping Initiated 23   Time Pumping Initiated 1056   Left Breast Soft   Right Breast Soft   Left Nipple Everted   Right Nipple Everted   Intervention Breast pump   Breastfeeding Status Yes   Breastfeeding Progress Pumping only   Reasons for not Breastfeeding Infant medical condition   Breast Pump   Pump 3  (CM Consult to see about Jairo Arevalo)   Pump Review/Education Setup, frequency, and cleaning;Milk storage   Initiated by Matilde Evans, [address] [place] [address] 107   Date Initiated 23   Patient Follow-Up   Lactation Consult Status 2   Follow-Up Type Inpatient;Call as needed   Other OB Lactation Documentation    Additional Problem Noted Mom wants to pump for baby in NICU. Set up with pump and Cycled through pumping session. No colostrum after pumping. HE minimally effective. Reviewed hands on pumping. (RSB and D/C booklets reviewed.)          Feeding recommendations:  pump every 2-3 hours     Mom provided with and discussed RBS, Hand expression/2nd night handout and increase supply for NICU baby. Reviewed pumping log and expectations for pumping output in the first week. Reviewed cycle pumping and appropriate pump settings, as well as pumping for 10-15 min 8-12 times per day. Enc Mom to discussed putting baby to the breast with the NICU team when baby is medically stable to do so. Enc her to call for lactation support as needed throughout her stay. Met with mother. Provided mother with Ready, Set, Baby booklet which contained information on:  Hand expression with access to QR codes to review hand expression. Positioning and latch reviewed as well as showing images of other feeding positions. Discussed the properties of a good latch in any position. Feeding on cue and what that means for recognizing infant's hunger, s/s that baby is getting enough milk and some s/s that breastfeeding dyad may need further help  Skin to Skin contact an benefits to mom and baby  Avoidance of pacifiers for the first month discussed. Gave information on common concerns, what to expect the first few weeks after delivery, preparing for other caregivers, and how partners can help. Resources for support also provided. Met with mother to go over discharge breastfeeding booklet including the feeding log. Emphasized 8 or more (12) feedings in a 24 hour period, what to expect for the number of diapers per day of life and the progression of properties of the  stooling pattern.     Reviewed breastfeeding and your lifestyle, storage and preparation of breast milk, how to keep you breast pump clean, the employed breastfeeding mother and paced bottle feeding handouts. Booklet included Breastfeeding Resources for after discharge including access to the number for the [employer]. Encouraged parents to call for assistance, questions, and concerns about breastfeeding. Extension provided.     Pranav Fong 9/25/2023 11:00 AM

## 2023-09-25 NOTE — OB LABOR/OXYTOCIN SAFETY PROGRESS
Oxytocin Safety Progress Check Note - Alaina Casas 33 y.o. female MRN: [id number]    Unit/Bed#: -01 Encounter: [id number]    Dose (myke-units/min) Oxytocin: 24 myke-units/min  Contraction Frequency (minutes): 2-4  Contraction Quality: Mild  Tachysystole: No   Cervical Dilation: Lip/rim (Comment)        Cervical Effacement: 100  Fetal Station: 1  Baseline Rate: 140 bpm  Fetal Heart Rate: 140 BPM  FHR Category: 1               Vital Signs:   Vitals:    09/24/23 2100   BP: 130/64   Pulse: 77   Resp:    Temp: 98.5 °F (36.9 °C)   SpO2:        Notes/comments:     Cyndee Escobar is feeling more pressure, she has an anterior lip which is nonreducible and the baby is very low. Will allow her to progress to full cervical dilation and then start to push.         Clarita Baron DO 9/24/2023 10:33 PM

## 2023-09-25 NOTE — ANESTHESIA POSTPROCEDURE EVALUATION
Post-Op Assessment Note    CV Status:  Stable  Pain Score: 0    Pain management: adequate     Mental Status:  Alert and awake   Hydration Status:  Euvolemic   PONV Controlled:  Controlled   Airway Patency:  Patent      Post Op Vitals Reviewed: Yes      Staff: Anesthesiologist, CRNA   Comments: VSS    Post-op block assessment: site cleaned, catheter intact and no complications      No notable events documented.     BP      Temp      Pulse     Resp      SpO2

## 2023-09-25 NOTE — L&D DELIVERY NOTE
Vaginal Delivery Summary - OB/GYN   Lashawn Doshi 33 y.o. female MRN: [id number]  Unit/Bed#: -01 Encounter: [id number]    Pre-delivery Diagnosis:   Pregnancy at 37 weeks 2 days  Preeclampsia without severe features, UPC 0.51  Anemia, hemoglobin 9.6  GBS positive  Category 2 tracing    Post-delivery Diagnosis: same, delivered    Procedure: Vacuum-assisted vaginal Delivery     OBGYN Practice: [address] [place] [address]    Attending Physician: Dr. Zoë Bhakta  Resident Physician: Dr. Saurav Ku, PGY-4  Other Assistant: none    Anesthesia: Epidural ,     QBL: 824AL    Complications: none apparent    Specimens:   1. Arterial and venous cord gases  2. Cord blood  3. Segment of umbilical cord  4. Placenta to storage     Findings:  1. Viable female  on 2023  11:23 PM  via Vaginal, Vacuum (Extractor)  . with APGAR scores of 6  and 8  at 1 and 5 minutes, respectively. Weight pending at time of dictation for skin to skin bonding. 2. Spontaneous delivery of intact placenta   3. 2 degree laceration repaired with 3-0 Vicryl Rapide, right vaginal laceration repaired with the same suture, bilateral hemostatic periurethral lacerations    Gases:  Umbilical Artery  Recent Labs     23   PHCART 7.103*   BECART -77.4*       Umbilical Vein  Recent Labs     23   PHCVEN 7.175*   BECVEN -10.7*       Brief history and labor course:  Lashawn Doshi is a 33 y.o. [id number] 37w2d . She presented to labor and delivery for an induction of labor for preeclampsia without severe features, CBC/CMP were noted to be normal with the exception of anemia to 9.6, and UPC was 0.51. She was noted to be 1/50/-2 on admission and induced with a Stroud balloon. She received morphine for pain control. Pitocin was started and amniotomy was performed for clear fluid. She progressed to full cervical dilation and began pushing.     Description of delivery:  Due to suspicion of immediate or potential fetal compromise, vacuum assisted delivery was recommended after pushing for 20 minutes. The pt was consented verbally and agreed. NICU was notified. The fetus was noted to be in the ALESSIO position, +3 station. EFW 7 lbs. Bladder was drained. Anesthesia was adequate. Informed consent: The patient was informed of the risks and benefits of the procedure. Risks included but weren't limited to bleeding, infection, injury to then vaginal, cervix, urethra, bladder, rectum, and perineum. The patient was counseled on fetal risks including scalp laceration, bruising, subgaleal hematoma, cephalohematoma, intracranial hematoma,  jaundice and shoulder dystocia. The patient expressed understanding of the risks involved all questions were answered and the patient consented verbally to the procedure in addition to the delivery consent she had already signed. The Kiwi cup was applied to the fetal median flexion point and centered over the sagittal sutures approx 3 cm anterior to the posterior fontanelle. There was no vaginal or cervical tissue at the margin. With the start of the next contraction, a vacuum seal was established to a max of 600mmHg and gentle traction was applied. Advancement of the fetal head was noted with gentle traction on the suction device. The vacuum was applied at 2322 hours. 1. 1 pull was performed. 2. 0 pop-offs occurred. 3. 0 re applications were performed. The vacuum was released upon  of the fetal head and delivery was performed thereafter. The fetal vertex delivered ALESSIO position spontaneously. There was no nuchal cord. The right anterior shoulder delivered atraumatically with maternal expulsive forces and the assistance of gentle downward traction. The left posterior shoulder delivered with maternal expulsive forces and the assistance of gentle upward traction. The remainder of the fetus delivered spontaneously.       Upon delivery, the infant was placed on Tiyana's abdomen and the cord was doubly clamped and cut. Delayed cord clamping was achieved. The infant was noted to cry spontaneously and was moving all extremities appropriately. There was no evidence for injury. Awaiting nurse resuscitators evaluated the . Arterial and venous cord blood gases and cord blood was collected for analysis. These were promptly sent to the lab. In the immediate post-partum, active management of the 3rd stage of labor was performed with massage, the administration of 30 units of IV pitocin, and gentle traction on the umbilical cord. The placenta delivered spontaneously and was noted to have a paracentrally inserted 3 vessel cord. The placenta was sent to pathology      Laceration Repair  The vagina, cervix, perineum, and rectum were inspected and there was noted to be a second-degree laceration, a right vaginal laceration, and bilateral periurethral lacerations. The patient was comfortable with epidural for analgesia. The vaginal mucosa and submucosa were then re approximated using 3-0 vicryl rapide to the point of the hymenal ring. Interrupted 3-0 vicryl was used to re approximate the muscle and fascia. The superficial perineal fascia was then re approximated using 3-0 vicryl in a running non locked stitch downward followed by a running subcuticular stitch upward to the level of the introitus. At the conclusion of the procedure, all needle, sponge, and instrument counts were noted to be correct. Dr. Carrington Martin was present and participated in all key portions of the case. Disposition:  The patient is recovering in the labor room  The baby is going to NICU due to respiratory distress, and concern for  sepsis      Teo Norwood.  [address]  OB/GYN PGY-4  2023  12:18 AM

## 2023-09-26 VITALS
BODY MASS INDEX: 33.12 KG/M2 | OXYGEN SATURATION: 100 % | SYSTOLIC BLOOD PRESSURE: 132 MMHG | DIASTOLIC BLOOD PRESSURE: 84 MMHG | HEIGHT: 65 IN | RESPIRATION RATE: 18 BRPM | WEIGHT: 198.8 LBS | HEART RATE: 77 BPM | TEMPERATURE: 98.1 F

## 2023-09-26 PROCEDURE — 99024 POSTOP FOLLOW-UP VISIT: CPT | Performed by: OBSTETRICS & GYNECOLOGY

## 2023-09-26 RX ORDER — ACETAMINOPHEN AND CODEINE PHOSPHATE 120; 12 MG/5ML; MG/5ML
1 SOLUTION ORAL DAILY
Qty: 30 TABLET | Refills: 3 | Status: SHIPPED | OUTPATIENT
Start: 2023-09-26

## 2023-09-26 RX ORDER — IBUPROFEN 600 MG/1
600 TABLET ORAL EVERY 6 HOURS
Qty: 30 TABLET | Refills: 0
Start: 2023-09-26

## 2023-09-26 RX ADMIN — IBUPROFEN 600 MG: 600 TABLET ORAL at 00:31

## 2023-09-26 NOTE — LACTATION NOTE
09/26/23 1100   Lactation Consultation   Reason for Consult 20 minutes;5 min   Risk Factors NICU infant   Breasts/Nipples   Left Breast Engorged   Right Breast Engorged   Left Nipple Sore;Tender;Everted   Right Nipple Sore;Tender;Everted   Intervention Breast pump   Breastfeeding Status Yes   Breastfeeding Progress Pumping only   Reasons for not Breastfeeding Infant medical condition   Other OB Lactation Tools   Feeding Devices Pump   Patient Follow-Up   Lactation Consult Status 2   Follow-Up Type Inpatient;Call as needed   Other OB Lactation Documentation    Additional Problem Noted HE not effective. Just pumped and not getting any colostrum. Engorged. Tender spots on breast. Applied heat pads to area, helped massage to bring down milk. Pumped for an additional 10 minutes to help extract milk. No colostrum or milk. At the point advised [facility] to intervene. Discussed s/s engorgement, blocked milk ducts, and mastitis. Discussed how to remedy at home and when to contact physician. Reviewed engorgement and ways to reduce symptoms. Use a warmth on breasts with massage prior to feeding / pumping. Use massage during pumping over full ducts. Referred to discharge book / engorgement page. Encouraged parents to call for assistance, questions, and concerns about breastfeeding. Extension provided.

## 2023-09-26 NOTE — PROGRESS NOTES
Progress Note - OB/GYN  Junito Moya 33 y.o. female MRN: [id number]  Unit/Bed#:  323-01 Encounter: [id number]    Assessment and Plan     Junito Moya is a patient of: [address] [place] [address]. She is PPD# 2 s/p  VAVD  Recovering well and is stable. Discharge today      Pre-eclampsia in third trimester  Assessment & Plan  Systolic (84ZVJ), FGT:112 , Min:133 , GDM:829   Diastolic (10YKM), QCQ:86, Min:81, Max:88    CBC, CMP wnl, PC 0.51    * Status post vacuum-assisted vaginal delivery  Assessment & Plan  Lochia WNL   Recovering well   Appropriate bowel and bladder function   Pain well controlled   Tolerating diet   Breastfeeding: not yet   Ambulating without issues   No lower extremity tenderness  Baby in  NICU  Contra: POPs to OCPs  Strep Grp B PCR (no units)   Date Value   09/13/2023 Positive (A)     Rh Factor (no units)   Date Value   09/23/2023 Positive           Disposition    - Anticipate discharge home on PPD# 2      Subjective/Objective     Chief Complaint: Postpartum State     Subjective:    Junito Moya is PPD/POD#2 s/p  spontaneous vaginal delivery. She has no current complaints. Pain is well controlled. Patient is currently voiding. She is ambulating. Patient is currently passing flatus and has had no bowel movement. She is tolerating PO, and denies nausea or vomitting. Patient denies fever, chills, chest pain, shortness of breath, or calf tenderness. Lochia is minimal. She is  Not yet breastfeeding. She is recovering well and is stable.        Vitals:   /81 (BP Location: Right arm)   Pulse 73   Temp 98 °F (36.7 °C) (Oral)   Resp 18   Ht 5' 5" (1.651 m)   Wt 90.2 kg (198 lb 12.8 oz)   LMP 01/02/2023   SpO2 100%   Breastfeeding Yes   BMI 33.08 kg/m²       Intake/Output Summary (Last 24 hours) at 9/26/2023 0646  Last data filed at 9/25/2023 0830  Gross per 24 hour   Intake --   Output 600 ml   Net -600 ml       Invasive Devices     Peripheral Intravenous Line Duration           Peripheral IV 09/23/23 Dorsal (posterior); Left Hand 2 days                Physical Exam:   GEN: Jaquelin Mukherjee appears well, alert and oriented x 3, pleasant and cooperative   CARDIO: RRR, no murmurs or rubs  RESP:  CTAB, no wheezes or rales  ABDOMEN: soft, no tenderness, no distention, fundus @ U-2  EXTREMITIES: SCDs on, non tender, no erythema, b/l Jade's sign negative      Labs:     Hemoglobin   Date Value Ref Range Status   09/23/2023 9.6 (L) 11.5 - 15.4 g/dL Final   09/20/2023 9.5 (L) 11.5 - 15.4 g/dL Final     WBC   Date Value Ref Range Status   09/23/2023 8.40 4.31 - 10.16 Thousand/uL Final   09/20/2023 9.56 4.31 - 10.16 Thousand/uL Final     Platelets   Date Value Ref Range Status   09/23/2023 217 149 - 390 Thousands/uL Final   09/20/2023 209 149 - 390 Thousands/uL Final     Creatinine   Date Value Ref Range Status   09/23/2023 0.67 0.60 - 1.30 mg/dL Final     Comment:     Standardized to IDMS reference method   09/20/2023 0.71 0.60 - 1.30 mg/dL Final     Comment:     Standardized to IDMS reference method     AST   Date Value Ref Range Status   09/23/2023 17 13 - 39 U/L Final   09/20/2023 16 13 - 39 U/L Final     ALT   Date Value Ref Range Status   09/23/2023 5 (L) 7 - 52 U/L Final     Comment:     Specimen collection should occur prior to Sulfasalazine administration due to the potential for falsely depressed results. 09/20/2023 8 7 - 52 U/L Final     Comment:     Specimen collection should occur prior to Sulfasalazine administration due to the potential for falsely depressed results.            Clement Slater MD  9/26/2023  6:46 AM

## 2023-09-26 NOTE — LACTATION NOTE
Discharge Lactation: mom states breasts are engorged. Upon breast assessment: Swollen, taut glandular tissue bilaterally. Areolas are swollen and flat. Upon palpation, full breasts, tender to the touch. Noticeable red, slight pucking of skin in lower quadrant of the right breast. Left breast is tender to the touch toward the axilla. Mom has warm compress in upper quadrants. Demonstration of belkys anand's lymphatic drainage technique. Teach back noted. Demonstration of multi-user pump and hand pump - mom needs 21 mm flanges. - provided. Mom states pumping feels better with smaller flanges. Ed. On use of lanolin inside funnel and tunnel. Reviewed engorgement reduction techniques. Provided breast shells to wear between pump sessions. Enc. To call lactation when in NICU    Mom wants to see baby and me - called and msg to call mom    D/c reviewed    Mom called about engorged, painful breasts. Upon palpation, full glands, mild edema. Education on [place] lymphatic drainage. Demonstration, teach-back of massage, hand expression, hands on pumping techniques. Hand pumping was demonstrated with teach-back. Ice was provided to reduce swelling after pumping session. Pumping: Provided  21 mm flanges. Education on lanolin use on flange/ tunnel. Cycle pumping introduced. Enc. To pump max. Of 20 min. Cycle the pump 3 times. Enc. To pump every 2-2.5 hrs during the day and every 3 hrs. At night. Labeling containers and cleaning of pump parts was reviewed. Enc. To call lactation for additional support and management of milk transfer.

## 2023-09-26 NOTE — PLAN OF CARE
Problem: PAIN - ADULT  Goal: Verbalizes/displays adequate comfort level or baseline comfort level  Description: Interventions:  - Encourage patient to monitor pain and request assistance  - Assess pain using appropriate pain scale  - Administer analgesics based on type and severity of pain and evaluate response  - Implement non-pharmacological measures as appropriate and evaluate response  - Consider cultural and social influences on pain and pain management  - Notify physician/advanced practitioner if interventions unsuccessful or patient reports new pain  Outcome: Progressing     Problem: INFECTION - ADULT  Goal: Absence or prevention of progression during hospitalization  Description: INTERVENTIONS:  - Assess and monitor for signs and symptoms of infection  - Monitor lab/diagnostic results  - Monitor all insertion sites, i.e. indwelling lines, tubes, and drains  - Monitor endotracheal if appropriate and nasal secretions for changes in amount and color  - Bradshaw appropriate cooling/warming therapies per order  - Administer medications as ordered  - Instruct and encourage patient and family to use good hand hygiene technique  - Identify and instruct in appropriate isolation precautions for identified infection/condition  Outcome: Progressing  Goal: Absence of fever/infection during neutropenic period  Description: INTERVENTIONS:  - Monitor WBC    Outcome: Progressing     Problem: SAFETY ADULT  Goal: Patient will remain free of falls  Description: INTERVENTIONS:  - Educate patient/family on patient safety including physical limitations  - Instruct patient to call for assistance with activity   - Consult OT/PT to assist with strengthening/mobility   - Keep Call bell within reach  - Keep bed low and locked with side rails adjusted as appropriate  - Keep care items and personal belongings within reach  - Initiate and maintain comfort rounds  - Make Fall Risk Sign visible to staff  - Apply yellow socks and bracelet for high fall risk patients  - Consider moving patient to room near nurses station  Outcome: Progressing  Goal: Maintain or return to baseline ADL function  Description: INTERVENTIONS:  -  Assess patient's ability to carry out ADLs; assess patient's baseline for ADL function and identify physical deficits which impact ability to perform ADLs (bathing, care of mouth/teeth, toileting, grooming, dressing, etc.)  - Assess/evaluate cause of self-care deficits   - Assess range of motion  - Assess patient's mobility; develop plan if impaired  - Assess patient's need for assistive devices and provide as appropriate  - Encourage maximum independence but intervene and supervise when necessary  - Involve family in performance of ADLs  - Assess for home care needs following discharge   - Consider OT consult to assist with ADL evaluation and planning for discharge  - Provide patient education as appropriate  Outcome: Progressing  Goal: Maintains/Returns to pre admission functional level  Description: INTERVENTIONS:  - Perform BMAT or MOVE assessment daily.   - Set and communicate daily mobility goal to care team and patient/family/caregiver. - Collaborate with rehabilitation services on mobility goals if consulted  - Out of bed for toileting  - Record patient progress and toleration of activity level   Outcome: Progressing     Problem: Knowledge Deficit  Goal: Patient/family/caregiver demonstrates understanding of disease process, treatment plan, medications, and discharge instructions  Description: Complete learning assessment and assess knowledge base.   Interventions:  - Provide teaching at level of understanding  - Provide teaching via preferred learning methods  Outcome: Progressing     Problem: DISCHARGE PLANNING  Goal: Discharge to home or other facility with appropriate resources  Description: INTERVENTIONS:  - Identify barriers to discharge w/patient and caregiver  - Arrange for needed discharge resources and transportation as appropriate  - Identify discharge learning needs (meds, wound care, etc.)  - Arrange for interpretive services to assist at discharge as needed  - Refer to Case Management Department for coordinating discharge planning if the patient needs post-hospital services based on physician/advanced practitioner order or complex needs related to functional status, cognitive ability, or social support system  Outcome: Progressing     Problem: POSTPARTUM  Goal: Experiences normal postpartum course  Description: INTERVENTIONS:  - Monitor maternal vital signs  - Assess uterine involution and lochia  Outcome: Progressing  Goal: Appropriate maternal -  bonding  Description: INTERVENTIONS:  - Identify family support  - Assess for appropriate maternal/infant bonding   -Encourage maternal/infant bonding opportunities  - Referral to  or  as needed  Outcome: Progressing  Goal: Establishment of infant feeding pattern  Description: INTERVENTIONS:  - Assess breast/bottle feeding  - Refer to lactation as needed  Outcome: Progressing  Goal: Incision(s), wounds(s) or drain site(s) healing without S/S of infection  Description: INTERVENTIONS  - Assess and document dressing, incision, wound bed, drain sites and surrounding tissue  - Provide patient and family education  - Perform skin care/dressing changes  Outcome: Progressing

## 2023-09-26 NOTE — CASE MANAGEMENT
Case Management Progress Note    Patient name Jakob Basket  Location /-01 MRN [id number]  : 1995 Date 2023       LOS (days): 3  Geometric Mean LOS (GMLOS) (days):   Days to GMLOS:        OBJECTIVE:        Current admission status: Inpatient  Preferred Pharmacy:   [address] [place] [address] ROEL Barry  [address] [facility]  [facility]  [address] [address]  Phone: [phone] Fax: Deja Gomez [address] [place] [address]  Phone: [phone] Fax: [phone]    Primary Care Provider: Robinson Alves MD    Primary Insurance: Melissa Jarrell  Secondary Insurance:     PROGRESS NOTE:      Consult for MOB's Breast Pump:    MANUELITO SPRING placed order for Ameda Chantelle breast pump for room delivery via [facility] on [address]. Jesús Alvarez ([employer]) will deliver. No additional case management needs reported; SAW will continue to follow. Declines

## 2023-09-27 ENCOUNTER — TELEPHONE (OUTPATIENT)
Dept: FAMILY MEDICINE CLINIC | Facility: CLINIC | Age: 28
End: 2023-09-27

## 2023-09-27 LAB
ABO GROUP BLD BPU: NORMAL
ABO GROUP BLD BPU: NORMAL
BPU ID: NORMAL
BPU ID: NORMAL
CROSSMATCH: NORMAL
CROSSMATCH: NORMAL
UNIT DISPENSE STATUS: NORMAL
UNIT DISPENSE STATUS: NORMAL
UNIT PRODUCT CODE: NORMAL
UNIT PRODUCT CODE: NORMAL
UNIT PRODUCT VOLUME: 350 ML
UNIT PRODUCT VOLUME: 350 ML
UNIT RH: NORMAL
UNIT RH: NORMAL

## 2023-09-27 NOTE — UTILIZATION REVIEW
NOTIFICATION OF INPATIENT ADMISSION   MATERNITY/DELIVERY AUTHORIZATION REQUEST   SERVICING FACILITY:   [address] [place] [address] - L&D, , NICU  [address] [facility]. [facility]), [address] [place] [address]  Tax ID: [id number]  NPI: [id number]   ATTENDING PROVIDER:  Attending Name and NPI#: Leonides Liriano, [id number] [place] [address] [[id number]]  Address: [address] [place] [address]. [facility]), [address] [place] [address]  Phone: [phone]   ADMISSION INFORMATION:  Place of Service: Inpatient [address] [facility]   Place of Service Code: 21  Inpatient Admission Date/Time: 23  9:12 PM  Discharge Date/Time: 2023  4:45 PM  Admitting Diagnosis Code/Description:  Encounter for induction of labor [Z34.90]  37 weeks gestation of pregnancy [Z3A.37]  Encounter for full-term uncomplicated delivery [Q07]     Mother: Melody Villarreal 1995 Estimated Date of Delivery: 10/14/23  Delivering clinician: Leonides Liriano    OB History        1    Para   1    Term   1       0    AB   0    Living   1       SAB   0    IAB   0    Ectopic   0    Multiple   0    Live Births   1               [place] Name & MRN:   Information for the patient's :  Kacy Lean Girl Jorge Edward) [[id number]]     [place] Delivery Information:  Sex: female  Delivered 2023 11:23 PM by Vaginal, Vacuum (Extractor); Gestational Age: 43w4d    [place] Measurements:  Weight: 6 lb 4.9 oz (2860 g); Height: 19.29"    APGAR 1 minute 5 minutes 10 minutes   Totals: 6 8      [place] Birth Information: 33 y.o. female MRN: [id number] Unit/Bed#: -01   Birthweight: No birth weight on file. Gestational Age: <None> Delivery Type:    APGARS Totals:        UTILIZATION REVIEW CONTACT:  Hugo Elise Utilization   Network Utilization Review Department  Phone: [phone]  Fax [phone]  Email: [email or web address]  Contact for approvals/pending authorizations, clinical reviews, and discharge.      PHYSICIAN ADVISORY SERVICES:  Medical Necessity Denial & Diyo-wc-Wnkh Review  Phone: [phone]  Fax: [phone]  Email: [email or web address]

## 2023-09-28 ENCOUNTER — TRANSITIONAL CARE MANAGEMENT (OUTPATIENT)
Dept: FAMILY MEDICINE CLINIC | Facility: CLINIC | Age: 28
End: 2023-09-28

## 2023-09-28 PROCEDURE — 88307 TISSUE EXAM BY PATHOLOGIST: CPT | Performed by: STUDENT IN AN ORGANIZED HEALTH CARE EDUCATION/TRAINING PROGRAM

## 2023-11-07 ENCOUNTER — POSTPARTUM VISIT (OUTPATIENT)
Dept: OBGYN CLINIC | Facility: CLINIC | Age: 28
End: 2023-11-07

## 2023-11-07 VITALS
DIASTOLIC BLOOD PRESSURE: 82 MMHG | SYSTOLIC BLOOD PRESSURE: 138 MMHG | RESPIRATION RATE: 16 BRPM | WEIGHT: 171 LBS | BODY MASS INDEX: 28.46 KG/M2 | HEART RATE: 72 BPM

## 2023-11-07 DIAGNOSIS — O99.013 ANEMIA AFFECTING PREGNANCY IN THIRD TRIMESTER: ICD-10-CM

## 2023-11-07 DIAGNOSIS — Z87.59 STATUS POST VACUUM-ASSISTED VAGINAL DELIVERY: Primary | ICD-10-CM

## 2023-11-07 RX ORDER — MV-MN 110/FA/OM3/DHA/EPA/FISH 180-35-25
1 TABLET,CHEWABLE ORAL DAILY
Qty: 90 TABLET | Refills: 1 | Status: SHIPPED | OUTPATIENT
Start: 2023-11-07

## 2023-11-07 RX ORDER — FERROUS SULFATE 324(65)MG
324 TABLET, DELAYED RELEASE (ENTERIC COATED) ORAL
Qty: 30 TABLET | Refills: 1 | Status: SHIPPED | OUTPATIENT
Start: 2023-11-07

## 2023-11-07 RX ORDER — ACETAMINOPHEN AND CODEINE PHOSPHATE 120; 12 MG/5ML; MG/5ML
1 SOLUTION ORAL DAILY
Qty: 30 TABLET | Refills: 3 | Status: SHIPPED | OUTPATIENT
Start: 2023-11-07

## 2023-11-07 NOTE — PROGRESS NOTES
POSTPARTUM VISIT    Sd Rucker presents today for postpartum visit. She had a vaginal (VAVD)  delivery on 09/24/23. Complications included none. She is breast and bottle feeding her infant and reports some difficulties  with such (pumping). She desires POPs for contraception. She was provided with Carol Legacy Depression Screening tool and her score was 6. Review of Systems:   -Constitutional: denies issues, denies pain   -Breasts: denies tenderness   -Gynecologic: lochia ceased   -Urinary: denies issues urinating   -GI: stools WNL, denies issues    Physical Exam:   -Vitals:   Vitals:    11/07/23 0844   BP: 138/82   BP Location: Right arm   Patient Position: Sitting   Cuff Size: Standard   Pulse: 72   Resp: 16   Weight: 77.6 kg (171 lb)      -General: A&Ox3, no acute distress noted   -Abdomen: soft, non-tender, incision appears clean/dry/intact and well-healed   -Extremities: nontender, no edema noted       Assessment/Plan:  1. Normal postpartum exam.  2. Depression screening negative. 3. Last pap smear was done 04/25/22 and result was negative. Advise return for next annual GYN exam in April 2024. 4. Contraception: POPs  5. Info given for baby and me center to assist in breast feeding.

## 2024-04-25 PROBLEM — Z34.93 PRENATAL CARE, THIRD TRIMESTER: Chronic | Status: RESOLVED | Noted: 2023-07-27 | Resolved: 2024-04-25

## 2024-04-25 PROBLEM — O99.013 ANEMIA AFFECTING PREGNANCY IN THIRD TRIMESTER: Status: RESOLVED | Noted: 2023-09-24 | Resolved: 2024-04-25

## 2024-04-25 PROBLEM — O14.93 PRE-ECLAMPSIA IN THIRD TRIMESTER: Status: RESOLVED | Noted: 2023-06-09 | Resolved: 2024-04-25

## 2024-04-25 PROBLEM — B95.1 POSITIVE GBS TEST: Status: RESOLVED | Noted: 2023-09-23 | Resolved: 2024-04-25

## 2024-04-25 NOTE — PROGRESS NOTES
ASSESSMENT & PLAN: Fatuma Sow is a 28 y.o.  with gynecological exam deferred, as she is currently on her period with a heavy flow.     1.  Routine well woman exam done today  2.  Pap and HPV:  The patient's last pap was 22.    It was normal.    Pap was not done today.    Current ASCCP Guidelines reviewed.   3.  The following were reviewed in today's visit: breast self exam, exercise, and healthy diet.    CC:  Annual Gynecologic Examination    HPI: Fatuma Sow is a 28 y.o.  who presents for annual gynecologic examination.  She has the following concerns:  OCP discussion    Health Maintenance:    She wears her seatbelt routinely.    She does not perform regular monthly self breast exams.    She feels safe at home.     Past Medical History:   Diagnosis Date    Varicella        Past Surgical History:   Procedure Laterality Date    WISDOM TOOTH EXTRACTION      WISDOM TOOTH EXTRACTION         Past OB/Gyn History:  OB History          1    Para   1    Term   1       0    AB   0    Living   1         SAB   0    IAB   0    Ectopic   0    Multiple   0    Live Births   1               Pt has menstrual issues. Heave periods, regular, bleeds up to 6 days   History of sexually transmitted infection: No.  History of abnormal pap smears: No     Patient is currently sexually active.  heterosexual.  The current method of family planning is none and OCP (estrogen/progesterone).    Family History   Problem Relation Age of Onset    Hypertension Mother     Ovarian cancer Mother     Hypertension Maternal Grandmother     Diabetes Maternal Grandmother     Glaucoma Maternal Grandmother     Stroke Paternal Grandfather     Hypertension Paternal Grandfather     Bipolar disorder Paternal Grandfather        Social History:  Social History     Socioeconomic History    Marital status: Single     Spouse name: Not on file    Number of children: Not on file    Years of education: Not on file     Highest education level: Not on file   Occupational History    Not on file   Tobacco Use    Smoking status: Never     Passive exposure: Never    Smokeless tobacco: Never   Vaping Use    Vaping status: Never Used   Substance and Sexual Activity    Alcohol use: Not Currently    Drug use: Never    Sexual activity: Not Currently     Partners: Male     Birth control/protection: None   Other Topics Concern    Not on file   Social History Narrative    Not on file     Social Determinants of Health     Financial Resource Strain: Medium Risk (3/21/2023)    Overall Financial Resource Strain (CARDIA)     Difficulty of Paying Living Expenses: Somewhat hard   Food Insecurity: Food Insecurity Present (3/21/2023)    Hunger Vital Sign     Worried About Running Out of Food in the Last Year: Sometimes true     Ran Out of Food in the Last Year: Sometimes true   Transportation Needs: No Transportation Needs (3/21/2023)    PRAPARE - Transportation     Lack of Transportation (Medical): No     Lack of Transportation (Non-Medical): No   Physical Activity: Not on file   Stress: Not on file   Social Connections: Not on file   Intimate Partner Violence: Not on file   Housing Stability: Low Risk  (9/6/2023)    Housing Stability Vital Sign     Unable to Pay for Housing in the Last Year: No     Number of Places Lived in the Last Year: 1     Unstable Housing in the Last Year: No     Presently lives with crista. Patient is currently employed     No Known Allergies      Current Outpatient Medications:     norethindrone-ethinyl estradiol (Junel FE 1/20) 1-20 MG-MCG per tablet, Take 1 tablet by mouth daily, Disp: 28 tablet, Rfl: 11    Blood Pressure KIT, Use in the morning (Patient not taking: Reported on 11/7/2023), Disp: 1 kit, Rfl: 0    Blood Pressure Monitoring (RA Blood Pressure Cuff Monitor) JUVE, , Disp: , Rfl:     ferrous sulfate 324 (65 Fe) mg, Take 1 tablet (324 mg total) by mouth daily before breakfast (Patient not taking: Reported on  "4/29/2024), Disp: 30 tablet, Rfl: 1    ibuprofen (MOTRIN) 600 mg tablet, Take 1 tablet (600 mg total) by mouth every 6 (six) hours (Patient not taking: Reported on 11/7/2023), Disp: 30 tablet, Rfl: 0    norethindrone (Ortho Micronor) 0.35 MG tablet, Take 1 tablet (0.35 mg total) by mouth daily (Patient not taking: Reported on 4/29/2024), Disp: 30 tablet, Rfl: 3    Prenatal MV & Min w/FA-DHA ([facility] Prenatal Gummy) 0.18-25 MG CHEW, Chew 1 tablet in the morning (Patient not taking: Reported on 4/29/2024), Disp: 90 tablet, Rfl: 1      Review of Systems  Constitutional :no fever, feels well, no tiredness, no recent weight gain or loss  ENT: no ear ache, no loss of hearing, no nosebleeds or nasal discharge, no sore throat or hoarseness.  Cardiovascular: no complaints of slow or fast heart beat, no chest pain, no palpitations, no leg claudication or lower extremity edema.  Respiratory: no complaints of shortness of shortness of breath, no FARMER  Breasts:no complaints of breast pain, breast lump, or nipple discharge  Gastrointestinal: no complaints of abdominal pain, constipation, nausea, vomiting, or diarrhea or bloody stools  Genitourinary : no complaints of dysuria, incontinence, pelvic pain, no dysmenorrhea, vaginal discharge or abnormal vaginal bleeding and as noted in HPI.  Musculoskeletal: no complaints of arthralgia, no myalgia, no joint swelling or stiffness, no limb pain or swelling.  Integumentary: no complaints of skin rash or lesion, itching or dry skin  Neurological: no complaints of headache, no confusion, no numbness or tingling, no dizziness or fainting    Objective      /79 (BP Location: Left arm, Patient Position: Sitting, Cuff Size: Adult)   Pulse 75   Resp 18   Ht 5' 5\" (1.651 m)   Wt 70.3 kg (155 lb)   LMP 04/26/2024 (Exact Date)   Breastfeeding No   BMI 25.79 kg/m²   General:   appears stated age, cooperative, alert normal mood and affect   Neck: normal, supple,trachea midline, no masses "   Heart: regular rate and rhythm, S1, S2 normal, no murmur, click, rub or gallop   Lungs: clear to auscultation bilaterally   Breasts: normal appearance, no masses or tenderness, Inspection negative, No nipple retraction or dimpling, No nipple discharge or bleeding, No axillary or supraclavicular adenopathy, Normal to palpation without dominant masses, normal findings: normal in size and symmetry, normal contour with no evidence of flattening or dimpling, skin normal, nipples everted without rashes or discharge, palpation negative for masses or nodules, no palpable axillary lymphadenopathy   Abdomen: soft, non-tender, without masses or organomegaly   Vulva: deferred   Vagina: not evaluated   Urethra:  Not performed   Cervix: Not examined   Uterus: Not examined   Adnexa: Not examined   Lymphatic palpation of lymph nodes in neck, axilla, groin and/or other locations: no lymphadenopathy or masses noted   Skin normal skin turgor and no rashes.   Psychiatric orientation to person, place, and time: normal. mood and affect: normal

## 2024-04-29 ENCOUNTER — ANNUAL EXAM (OUTPATIENT)
Dept: OBGYN CLINIC | Facility: CLINIC | Age: 29
End: 2024-04-29

## 2024-04-29 VITALS
HEART RATE: 75 BPM | BODY MASS INDEX: 25.83 KG/M2 | RESPIRATION RATE: 18 BRPM | WEIGHT: 155 LBS | SYSTOLIC BLOOD PRESSURE: 120 MMHG | HEIGHT: 65 IN | DIASTOLIC BLOOD PRESSURE: 79 MMHG

## 2024-04-29 DIAGNOSIS — Z01.419 ENCOUNTER FOR GYNECOLOGICAL EXAMINATION (GENERAL) (ROUTINE) WITHOUT ABNORMAL FINDINGS: Primary | ICD-10-CM

## 2024-04-29 PROCEDURE — 99395 PREV VISIT EST AGE 18-39: CPT | Performed by: OBSTETRICS & GYNECOLOGY

## 2024-04-29 RX ORDER — NORETHINDRONE ACETATE AND ETHINYL ESTRADIOL 1MG-20(21)
1 KIT ORAL DAILY
Qty: 28 TABLET | Refills: 11 | Status: SHIPPED | OUTPATIENT
Start: 2024-04-29

## 2025-03-20 ENCOUNTER — SOCIAL WORK (OUTPATIENT)
Age: 30
End: 2025-03-20

## 2025-03-20 DIAGNOSIS — F33.1 MDD (MAJOR DEPRESSIVE DISORDER), RECURRENT EPISODE, MODERATE (HCC): Primary | ICD-10-CM

## 2025-03-20 DIAGNOSIS — F41.1 GAD (GENERALIZED ANXIETY DISORDER): ICD-10-CM

## 2025-03-20 PROCEDURE — MBD PR MOVING BEYOND DEPRESSION: Performed by: SOCIAL WORKER

## 2025-03-20 NOTE — PSYCH
Behavioral Health Psychotherapy Assessment    Date of Initial Psychotherapy Assessment: 03/20/25  Referral Source: ***  Has a release of information been signed for the referral source? {Yes/No/NA:23021}    Preferred Name: Fatuma Sow  Preferred Pronouns: {Preferred Pronouns:99025}  YOB: 1995 Age: 29 y.o.  Sex assigned at birth: {SL Sex Assigned at Birth:1531893096}   Gender Identity: ***  Race: {Race/ethnicity:06532}  Preferred Language: {Misc; languages:05617}    Emergency Contact:  Full Name: ***  Relationship to Client: ***  Contact information: ***    Primary Care Physician:  Jay Fish MD  [address] [place] [id number] [phone]  Has a release of information been signed? {Yes/No/NA:01097}    Physical Health History:  Past surgical procedures: ***  Do you have a history of any of the following: {SL Amb Psych Physical Health:59223}  Do you have any mobility issues? {YES-DESCRIBE/NO:13984}  Developmental History: ***    Relevant Family History:  ***    Presenting Problem (What brings you in?)  ***    Mental Health Advance Directive:  Do you currently have a Mental Health Advance Directive?{YES/NO:20200}    Diagnosis:   Diagnosis ICD-10-CM Associated Orders   1. MDD (major depressive disorder), recurrent episode, moderate (HCC)  F33.1       2. TADEO (generalized anxiety disorder)  F41.1           Initial Assessment:     Social Determinants of Health:    SDOH:  None        Visit start and stop times:    03/20/25  Start Time: 1030  Stop Time: 1200  Total Visit Time: 90 minutes
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
## 2025-03-25 ENCOUNTER — SOCIAL WORK (OUTPATIENT)
Age: 30
End: 2025-03-25

## 2025-03-25 DIAGNOSIS — F33.1 MDD (MAJOR DEPRESSIVE DISORDER), RECURRENT EPISODE, MODERATE (HCC): ICD-10-CM

## 2025-03-25 DIAGNOSIS — F41.1 GAD (GENERALIZED ANXIETY DISORDER): Primary | ICD-10-CM

## 2025-03-25 PROCEDURE — MBD PR MOVING BEYOND DEPRESSION: Performed by: SOCIAL WORKER

## 2025-03-25 NOTE — PSYCH
"Behavioral Health Psychotherapy Progress Note    Psychotherapy Provided: Individual Psychotherapy     1. TADEO (generalized anxiety disorder)        2. MDD (major depressive disorder), recurrent episode, moderate (HCC)            Goals addressed in session: Goal 1     DATA: This clinician met with Fatuma Sow for IH-CBT.  Session #1.  Completed the EPDS and she scored 20.    Homework Review: Goals worksheet    [place] Items: Goals, safety plan    Sessions Content: Fatuma states that she has had a decent week.  She has thought from time to self injure but she does not act on them.  She is aware of them thoughts but she tries to distract herself from them.  She feels that she does a pretty good job of it because she has been coping with these thoughts for quite some time.  We went over her goals and completed the Safety Plan.    Homework Assigned: Begin to think about how negative thoughts impact her.    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    Plan: F/U 4/2 @ 12:30  During this session, this clinician used the following therapeutic modalities: Cognitive Behavioral Therapy    Substance Abuse was not addressed during this session. If the client is diagnosed with a co-occurring substance use disorder, please indicate any changes in the frequency or amount of use: N/A. Stage of change for addressing substance use diagnoses: No substance use/Not applicable    ASSESSMENT:  Fatuma Sow presents with a Anxious and Depressed mood.     her affect is Normal range and intensity, which is congruent, with her mood and the content of the session. The client has not made progress on their goals.    Currently, Fatuma Sow presents with a none risk of suicide, none risk of self-harm, and none risk of harm to others.    For any risk assessment that surpasses a \"low\" rating, a safety plan must be developed.    A safety plan was indicated: no  If yes, " describe in detail COMPLETED    PLAN: Between sessions, Fatuma ALAS Coachman will work on identifying changes in mood. At the next session, the therapist will use Cognitive Behavioral Therapy to address symptoms of depression and anxiety.    Behavioral Health Treatment Plan and Discharge Planning: Fatuma ALAS Coachman is aware of and agrees to continue to work on their treatment plan. They have identified and are working toward their discharge goals. yes    Depression Follow-up Plan Completed: Not applicable    Visit start and stop times:    03/25/25  Start Time: 1230  Stop Time: 1330  Total Visit Time: 60 minutes

## 2025-03-25 NOTE — BH TREATMENT PLAN
Outpatient Behavioral Health Psychotherapy Treatment Plan    Fatuma Sow  1995     Date of Initial Psychotherapy Assessment: 3/20/25   Date of Current Treatment Plan: 03/25/25  Treatment Plan Target Date: 7/25/25  Treatment Plan Expiration Date: 9/25/25    Diagnosis:   1. TADEO (generalized anxiety disorder)        2. MDD (major depressive disorder), recurrent episode, moderate (HCC)            Area(s) of Need: Improved overall mental health    Long Term Goal 1 (in the client's own words): Improve my mental health    Stage of Change: Contemplation    Target Date for completion: 7/25/25     Anticipated therapeutic modalities: CBT     People identified to complete this goal:  Fatuma      Objective 1: (identify the means of measuring success in meeting the objective):  Decrease symptoms of depression.      Objective 2: (identify the means of measuring success in meeting the objective): Decrease thoughts of SIB.      Long Term Goal 2 (in the client's own words): Find housing    Stage of Change: Contemplation    Target Date for completion: 9/25/25     Anticipated therapeutic modalities:  MI and CBT     People identified to complete this goal: Fatuma      Objective 1: (identify the means of measuring success in meeting the objective):  Find a place that is affordable and assistance in getting her set up with first months rent.      Objective 2: (identify the means of measuring success in meeting the objective):  I'll feel better in a place where there is not so much negativity.     Long Term Goal 3 (in the client's own words): Get my [employer] for all of her independent work that she is doing.    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      Objective 2: (identify the means of measuring success in meeting the objective):  Be more organized with work and finances.     I am currently under the care of a [facility] psychiatric provider: no    My [facility] psychiatric provider is: N/A    I am currently taking psychiatric medications: No    I feel that I will be ready for discharge from mental health care when I reach the following (measurable goal/objective): My mental health is improved.    For children and adults who have a legal guardian:   Has there been any change to custody orders and/or guardianship status? NA. If yes, attach updated documentation.    I have created my Crisis Plan and have been offered a copy of this plan    Behavioral Health Treatment Plan [facility]: Diagnosis and Treatment Plan explained to Fatuma Sow acknowledges an understanding of their diagnosis. Fatuma Sow agrees to this treatment plan.    I have been offered a copy of this Treatment Plan. no

## 2025-04-02 ENCOUNTER — SOCIAL WORK (OUTPATIENT)
Age: 30
End: 2025-04-02

## 2025-04-02 DIAGNOSIS — F33.1 MDD (MAJOR DEPRESSIVE DISORDER), RECURRENT EPISODE, MODERATE (HCC): Primary | ICD-10-CM

## 2025-04-02 DIAGNOSIS — F41.1 GAD (GENERALIZED ANXIETY DISORDER): ICD-10-CM

## 2025-04-02 PROCEDURE — MBD PR MOVING BEYOND DEPRESSION: Performed by: SOCIAL WORKER

## 2025-04-02 NOTE — PSYCH
"Behavioral Health Psychotherapy Progress Note    Psychotherapy Provided: Individual Psychotherapy     1. MDD (major depressive disorder), recurrent episode, moderate (HCC)        2. TADEO (generalized anxiety disorder)            Goals addressed in session: Goal 1, Goal 2, and Goal 3      DATA: This clinician met with Fatuma Sow for IH-CBT.  Session #2.  Completed the EPDS and she scored 15.    Homework Review:None completed    [place] Items: Stressors, activity schedule, job interview.    Sessions Content: Fatuma states that she is feeling stressed by her mother mostly.  She state that mother is constantly on her back about \"what she should be doing.\"  She states that she feels that all she is doing is taking care of her daughter and the house.  Encouraged her to try to do one thing per day that she looks forward to and enjoys doing.    She has  job interview tomorrow and she is actively looking to find employment beyond the current \"jobs\" she has.  Her relationship with her mother significantly plays a part in her depression and anxiety.    Homework Assigned: Activity schedule with pleasurable activities.    Feedback from Mother: She is hoping to get a job soon.    Plan: F/U 4/8 @ 1430  During this session, this clinician used the following therapeutic modalities: Cognitive Behavioral Therapy    Substance Abuse was not addressed during this session. If the client is diagnosed with a co-occurring substance use disorder, please indicate any changes in the frequency or amount of use: N/A. Stage of change for addressing substance use diagnoses: No substance use/Not applicable    ASSESSMENT:  Fatuma Sow presents with a Euthymic/ normal and Anxious mood.     her affect is Normal range and intensity, which is congruent, with her mood and the content of the session. The client has not made progress on their goals.    Currently, Fatuma Sow presents with a none risk of suicide, none risk of self-harm, and " "none risk of harm to others.    For any risk assessment that surpasses a \"low\" rating, a safety plan must be developed.    A safety plan was indicated: no  If yes, describe in detail COMPLETED    PLAN: Between sessions, Fatuma Sow will work on implementing pleasurable activities into her daily life. At the next session, the therapist will use Cognitive Behavioral Therapy to address symptoms of depression and anxiety.    Behavioral Health Treatment Plan and Discharge Planning: Fatuma Sow is aware of and agrees to continue to work on their treatment plan. They have identified and are working toward their discharge goals. yes    Depression Follow-up Plan Completed: Not applicable    Visit start and stop times:    04/02/25  Start Time: 1230  Stop Time: 1330  Total Visit Time: 60 minutes  "

## 2025-04-08 ENCOUNTER — SOCIAL WORK (OUTPATIENT)
Age: 30
End: 2025-04-08

## 2025-04-08 DIAGNOSIS — F33.1 MDD (MAJOR DEPRESSIVE DISORDER), RECURRENT EPISODE, MODERATE (HCC): ICD-10-CM

## 2025-04-08 DIAGNOSIS — F41.1 GAD (GENERALIZED ANXIETY DISORDER): Primary | ICD-10-CM

## 2025-04-08 PROCEDURE — MBD PR MOVING BEYOND DEPRESSION: Performed by: SOCIAL WORKER
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
## 2025-04-15 ENCOUNTER — SOCIAL WORK (OUTPATIENT)
Age: 30
End: 2025-04-15

## 2025-04-15 DIAGNOSIS — F33.1 MDD (MAJOR DEPRESSIVE DISORDER), RECURRENT EPISODE, MODERATE (HCC): Primary | ICD-10-CM

## 2025-04-15 DIAGNOSIS — F41.1 GAD (GENERALIZED ANXIETY DISORDER): ICD-10-CM

## 2025-04-15 PROCEDURE — MBD PR MOVING BEYOND DEPRESSION: Performed by: SOCIAL WORKER

## 2025-04-15 NOTE — PSYCH
Behavioral Health Psychotherapy Progress Note    Psychotherapy Provided: Individual Psychotherapy     1. MDD (major depressive disorder), recurrent episode, moderate (HCC)        2. TADEO (generalized anxiety disorder)            Goals addressed in session: Goal 1, Goal 2, and Goal 3      DATA: This clinician met with Dickgigi ALAS marita for IH-CBT.  Session #4.  Completed the EPDS and she scored 7.    Homework Review: My Feeling worksheet completed.    [place] Items: job interviews, progress on housing assistance.    Sessions Content: Fatuma states that she has had a very good week.  She has two job interviews and she is hopeful that she will have the ability to get one or both of them.  She has been working on the housing applications through the conference of [facility].  She hopes that she is getting closer to being able to get an apartment.  She talked about her experience working on the My Feelings worksheet and she realized that she is has positive and negative feelings quite often.  She has hard time acknowledging her negative feelings at times but she notices that her mood is affected.  Gave her the assignment to recognize negative thoughts and then connect the feeling with the thought.    Homework Assigned: Thought/ Feeling worksheet    Feedback from Mother: She had a really good week.    Plan: F/U 4/22 @ 1300  During this session, this clinician used the following therapeutic modalities: Cognitive Behavioral Therapy    Substance Abuse was not addressed during this session. If the client is diagnosed with a co-occurring substance use disorder, please indicate any changes in the frequency or amount of use: N/A. Stage of change for addressing substance use diagnoses: No substance use/Not applicable    ASSESSMENT:  Fatuma Sow presents with a Euthymic/ normal mood.     her affect is Normal range and intensity, which is congruent, with her mood and the content of the session. The client has not made progress on  "their goals.    Currently Fatuma Sow presents with a none risk of suicide, none risk of self-harm, and none risk of harm to others.    For any risk assessment that surpasses a \"low\" rating, a safety plan must be developed.    A safety plan was indicated: no  If yes, describe in detail COMPLETE    PLAN: Between sessions, Fatuma Sow will  work on . At the next session, the therapist will use Cognitive Behavioral Therapy to address symptoms of depression and anxiety.    Behavioral Health Treatment Plan and Discharge Planning: Fatuma Sow is aware of and agrees to continue to work on their treatment plan. They have identified and are working toward their discharge goals. yes    Depression Follow-up Plan Completed: Not applicable    Visit start and stop times:    04/15/25  Start Time: 1300  Stop Time: 1400  Total Visit Time: 60 minutes  "

## 2025-04-22 ENCOUNTER — SOCIAL WORK (OUTPATIENT)
Age: 30
End: 2025-04-22

## 2025-04-22 DIAGNOSIS — F41.1 GAD (GENERALIZED ANXIETY DISORDER): Primary | ICD-10-CM

## 2025-04-22 DIAGNOSIS — F33.1 MDD (MAJOR DEPRESSIVE DISORDER), RECURRENT EPISODE, MODERATE (HCC): ICD-10-CM

## 2025-04-22 PROCEDURE — MBD PR MOVING BEYOND DEPRESSION: Performed by: SOCIAL WORKER

## 2025-04-22 NOTE — PSYCH
Behavioral Health Psychotherapy Progress Note    Psychotherapy Provided: Individual Psychotherapy     1. TADEO (generalized anxiety disorder)        2. MDD (major depressive disorder), recurrent episode, moderate (HCC)            Goals addressed in session: Goal 1, Goal 2, and Goal 3      DATA: This clinician met with Fatuma Sow for IH-CBT.  Session #5.  Completed the EPDS and she scored 6.    Homework Review: Thought feeling connection    [place] Items: Weekend plans, job    Sessions Content: Fatuma states that she has been doing very well.  Her anxiety has been lower as things have been going well for her.  She had her job interview at [employer] and she is waiting to hear back from them to see if she got the job.  She has continued to work on the application for [employer] to get assistance with housing.  She is hopeful that she will be able to get her own place soon.  She had the weekend without the baby and it was nice for her to get some extra sleep and also for her to spend some time doing things that she likes to do.    We went over the thought/feeling connection and she was able to identify how certain negative thoughts makes her feel.    Homework Assigned: Evaluating thoughts.    Feedback from Mother: She felt refreshed after the baby was with her aunt for the weekend.    Plan: F/U 5/1/25 @ 1300  During this session, this clinician used the following therapeutic modalities: Cognitive Behavioral Therapy    Substance Abuse was not addressed during this session. If the client is diagnosed with a co-occurring substance use disorder, please indicate any changes in the frequency or amount of use: N/A. Stage of change for addressing substance use diagnoses: No substance use/Not applicable    ASSESSMENT:  Fatuma Rosenbergmarita presents with a Euthymic/ normal mood.     her affect is Normal range and intensity, which is congruent, with her mood and the content of the session. The client has made progress on  "their goals.    Currently, Fatuma Sow presents with a none risk of suicide, none risk of self-harm, and none risk of harm to others.    For any risk assessment that surpasses a \"low\" rating, a safety plan must be developed.    A safety plan was indicated: no  If yes, describe in detail COMPLETED    PLAN: Between sessions, Fatuma Sow will work on evaluating negative thinking. At the next session, the therapist will use Cognitive Behavioral Therapy to address symptoms of depression and anxiety..    Behavioral Health Treatment Plan and Discharge Planning: Fatuma Sow is aware of and agrees to continue to work on their treatment plan. They have identified and are working toward their discharge goals. yes    Depression Follow-up Plan Completed: Not applicable    Visit start and stop times:    04/22/25  Start Time: 1300  Stop Time: 1400  Total Visit Time: 60 minutes  "

## 2025-04-28 ENCOUNTER — TELEPHONE (OUTPATIENT)
Dept: OBGYN CLINIC | Facility: CLINIC | Age: 30
End: 2025-04-28

## 2025-04-28 NOTE — TELEPHONE ENCOUNTER
"Patient called and left voicemail:    \"Mateolo, my name is Fatuma Sow and I'm just calling to schedule for OBGYN appointment. You can call me back at [phone]. Again, phone number is [phone]. Thank you.\"    Returned patient call and she has been scheduled.     "

## 2025-05-01 ENCOUNTER — SOCIAL WORK (OUTPATIENT)
Age: 30
End: 2025-05-01

## 2025-05-01 DIAGNOSIS — F33.1 MDD (MAJOR DEPRESSIVE DISORDER), RECURRENT EPISODE, MODERATE (HCC): Primary | ICD-10-CM

## 2025-05-01 DIAGNOSIS — F41.1 GAD (GENERALIZED ANXIETY DISORDER): ICD-10-CM

## 2025-05-01 PROCEDURE — MBD PR MOVING BEYOND DEPRESSION: Performed by: SOCIAL WORKER

## 2025-05-01 NOTE — PSYCH
Behavioral Health Psychotherapy Progress Note    Psychotherapy Provided: Individual Psychotherapy     1. MDD (major depressive disorder), recurrent episode, moderate (HCC)        2. TADEO (generalized anxiety disorder)            Goals addressed in session: Goal 1, Goal 2, and Goal 3      DATA: This clinician met with Fatuma ALAS marita for IH-CBT.  Session #6.  Completed the EPDS and she scored 14.    Homework Review: Evaluating thoughts worksheet    [place] Items: Fashion week, job, argument with mom    Sessions Content: Fatuma was excited to share that she was on TV yesterday as part of an interview with [employer] [place] fashion week.  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  She is very excited about it.  Her face lit up when she as talking about it.  She also shared that she got the job with [employer] and she is starting on 5/19.  Looking forward to it!  She had a big argument with her mother over the weekend and it made her feel very badly about herself.  She feels that her mother is critical of her and rarely has anything positive to say about her.  She tries to focus on her relationship with her own daughter so that they will have a strong bond and a good relationship as she grows up.    Homework Assigned: Continue with evaluating thoughts worksheet    Feedback from Mother: She is looking forward to the weekend and spending time with people that she looks up to at Fashion week.    Plan: F/U 5/8/25 @ 1300  During this session, this clinician used the following therapeutic modalities: Cognitive Behavioral Therapy    Substance Abuse was not addressed during this session. If the client is diagnosed with a co-occurring substance use disorder, please indicate any changes in the frequency or amount of use: N/A. Stage of change for addressing substance use diagnoses: No substance use/Not applicable    ASSESSMENT:  Monea L marita presents with a Euthymic/ normal and Depressed mood.     her affect is Normal range and  "intensity and Tearful, which is congruent, with her mood and the content of the session. The client has made progress on their goals.    Currently, Fatuma Sow presents with a none risk of suicide, none risk of self-harm, and none risk of harm to others.    For any risk assessment that surpasses a \"low\" rating, a safety plan must be developed.    A safety plan was indicated: no  If yes, describe in detail COMPLETED    PLAN: Between sessions, Fatuma Sow will continue to work on evaluating thoughts. At the next session, the therapist will use Cognitive Behavioral Therapy to address symptoms of depression and anxiety.    Behavioral Health Treatment Plan and Discharge Planning: Fatuma Sow is aware of and agrees to continue to work on their treatment plan. They have identified and are working toward their discharge goals. yes    Depression Follow-up Plan Completed: Not applicable    Visit start and stop times:    05/01/25  Start Time: 1300  Stop Time: 1400  Total Visit Time: 60 minutes  "

## 2025-05-08 ENCOUNTER — SOCIAL WORK (OUTPATIENT)
Age: 30
End: 2025-05-08

## 2025-05-08 DIAGNOSIS — F33.1 MDD (MAJOR DEPRESSIVE DISORDER), RECURRENT EPISODE, MODERATE (HCC): ICD-10-CM

## 2025-05-08 DIAGNOSIS — F41.1 GAD (GENERALIZED ANXIETY DISORDER): Primary | ICD-10-CM

## 2025-05-08 PROCEDURE — MBD PR MOVING BEYOND DEPRESSION: Performed by: SOCIAL WORKER

## 2025-05-08 NOTE — PSYCH
"Behavioral Health Psychotherapy Progress Note    Psychotherapy Provided: Individual Psychotherapy     1. TADEO (generalized anxiety disorder)        2. MDD (major depressive disorder), recurrent episode, moderate (HCC)            Goals addressed in session: Goal 1, Goal 2, and Goal 3      DATA: This clinician met with Fatuma Sow for IH-CBT.  Session #7.  Completed the EPDS and she scored 4.    Homework Review: None completed.    [place] Items: fashion week, new job.    Sessions Content: Fatuma states that she had a great time last weekend at [facility] fashion week. She states that being with all of the people there was a refresh for her and she had so much fun.  She was given a recognition and that was motivating for her as well.  She is looking forward to starting her new job in about a week and a half.  Getting the physical and finger printing this week.  She is maintaining distance from her mother.  Hoping that a housing might be available to her soon.    Homework Assigned: None    Feedback from Mother: She has a good week.    Plan:F/U  5/13 @ 1330  During this session, this clinician used the following therapeutic modalities: Cognitive Behavioral Therapy    Substance Abuse was not addressed during this session. If the client is diagnosed with a co-occurring substance use disorder, please indicate any changes in the frequency or amount of use: N/A. Stage of change for addressing substance use diagnoses: No substance use/Not applicable    ASSESSMENT:  Fatuma Sow presents with a Euthymic/ normal mood.     her affect is Normal range and intensity, which is congruent, with her mood and the content of the session. The client has not made progress on their goals.    Currently, Fatuma Sow presents with a none risk of suicide, none risk of self-harm, and none risk of harm to others.    For any risk assessment that surpasses a \"low\" rating, a safety plan must be developed.    A safety plan was indicated: " no  If yes, describe in detail COMPLETED    PLAN: Between sessions, Fatuma ALAS Coachman will work on restructuring negative thinking. At the next session, the therapist will use Cognitive Behavioral Therapy to address symptoms of depression and anxeity.    Behavioral Health Treatment Plan and Discharge Planning: Fatuma ALAS Coachman is aware of and agrees to continue to work on their treatment plan. They have identified and are working toward their discharge goals. yes    Depression Follow-up Plan Completed: Not applicable    Visit start and stop times:    05/08/25  Start Time: 1300  Stop Time: 1400  Total Visit Time: 60 minutes

## 2025-05-09 ENCOUNTER — APPOINTMENT (OUTPATIENT)
Dept: URGENT CARE | Facility: CLINIC | Age: 30
End: 2025-05-09

## 2025-05-09 ENCOUNTER — APPOINTMENT (OUTPATIENT)
Dept: LAB | Facility: CLINIC | Age: 30
End: 2025-05-09

## 2025-05-09 DIAGNOSIS — Z02.1 PRE-EMPLOYMENT HEALTH SCREENING EXAMINATION: Primary | ICD-10-CM

## 2025-05-09 DIAGNOSIS — Z02.1 PRE-EMPLOYMENT HEALTH SCREENING EXAMINATION: ICD-10-CM

## 2025-05-09 LAB — RUBV IGG SERPL IA-ACNC: 65 IU/ML

## 2025-05-09 PROCEDURE — 86762 RUBELLA ANTIBODY: CPT

## 2025-05-09 PROCEDURE — 86480 TB TEST CELL IMMUN MEASURE: CPT

## 2025-05-09 PROCEDURE — 86735 MUMPS ANTIBODY: CPT

## 2025-05-09 PROCEDURE — 36415 COLL VENOUS BLD VENIPUNCTURE: CPT

## 2025-05-09 PROCEDURE — 86765 RUBEOLA ANTIBODY: CPT

## 2025-05-09 PROCEDURE — 86787 VARICELLA-ZOSTER ANTIBODY: CPT

## 2025-05-10 LAB
GAMMA INTERFERON BACKGROUND BLD IA-ACNC: 0.03 IU/ML
M TB IFN-G BLD-IMP: NEGATIVE
M TB IFN-G CD4+ BCKGRND COR BLD-ACNC: -0.02 IU/ML
M TB IFN-G CD4+ BCKGRND COR BLD-ACNC: 0.01 IU/ML
MITOGEN IGNF BCKGRD COR BLD-ACNC: 6.38 IU/ML

## 2025-05-11 LAB
MEV IGG SER QL IA: >300 AU/ML
MEV IGG SER QL IA: POSITIVE
MUV IGG SER QL IA: >300 AU/ML
MUV IGG SER QL IA: POSITIVE
VZV IGG SER QL IA: 17.7 S/CO
VZV IGG SER QL IA: POSITIVE

## 2025-05-13 ENCOUNTER — SOCIAL WORK (OUTPATIENT)
Age: 30
End: 2025-05-13

## 2025-05-13 DIAGNOSIS — F41.1 GAD (GENERALIZED ANXIETY DISORDER): ICD-10-CM

## 2025-05-13 DIAGNOSIS — F33.1 MDD (MAJOR DEPRESSIVE DISORDER), RECURRENT EPISODE, MODERATE (HCC): Primary | ICD-10-CM

## 2025-05-13 PROCEDURE — MBD PR MOVING BEYOND DEPRESSION: Performed by: SOCIAL WORKER
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
## 2025-05-29 ENCOUNTER — ANNUAL EXAM (OUTPATIENT)
Dept: OBGYN CLINIC | Facility: CLINIC | Age: 30
End: 2025-05-29

## 2025-05-29 ENCOUNTER — PATIENT OUTREACH (OUTPATIENT)
Dept: OBGYN CLINIC | Facility: CLINIC | Age: 30
End: 2025-05-29

## 2025-05-29 VITALS
HEIGHT: 65 IN | SYSTOLIC BLOOD PRESSURE: 124 MMHG | BODY MASS INDEX: 23.36 KG/M2 | WEIGHT: 140.2 LBS | DIASTOLIC BLOOD PRESSURE: 58 MMHG

## 2025-05-29 DIAGNOSIS — Z01.419 WELL WOMAN EXAM WITH ROUTINE GYNECOLOGICAL EXAM: ICD-10-CM

## 2025-05-29 DIAGNOSIS — N75.0 BARTHOLIN GLAND CYST: ICD-10-CM

## 2025-05-29 DIAGNOSIS — Z12.4 SCREENING FOR CERVICAL CANCER: ICD-10-CM

## 2025-05-29 DIAGNOSIS — Z59.819 HOUSING INSECURITY: Primary | ICD-10-CM

## 2025-05-29 DIAGNOSIS — Z80.9 FAMILY HISTORY OF CANCER IN MOTHER: ICD-10-CM

## 2025-05-29 PROCEDURE — G0145 SCR C/V CYTO,THINLAYER,RESCR: HCPCS

## 2025-05-29 SDOH — ECONOMIC STABILITY - HOUSING INSECURITY: HOUSING INSTABILITY UNSPECIFIED: Z59.819
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
## 2025-06-03 LAB
LAB AP GYN PRIMARY INTERPRETATION: NORMAL
Lab: NORMAL

## 2025-06-04 ENCOUNTER — SOCIAL WORK (OUTPATIENT)
Age: 30
End: 2025-06-04

## 2025-06-04 DIAGNOSIS — F41.1 GAD (GENERALIZED ANXIETY DISORDER): Primary | ICD-10-CM

## 2025-06-04 DIAGNOSIS — F33.1 MDD (MAJOR DEPRESSIVE DISORDER), RECURRENT EPISODE, MODERATE (HCC): ICD-10-CM

## 2025-06-04 PROCEDURE — MBD PR MOVING BEYOND DEPRESSION: Performed by: SOCIAL WORKER
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
## 2025-06-05 ENCOUNTER — PATIENT OUTREACH (OUTPATIENT)
Dept: OBGYN CLINIC | Facility: CLINIC | Age: 30
End: 2025-06-05

## 2025-06-05 NOTE — PROGRESS NOTES
MANUELITO SPRING outreached pt  at [facility], [place],  reports she has not yet made contact w/family and plans to do so early next week to discuss the shelter wait list.    MANUELITO SPRING following.

## 2025-06-11 ENCOUNTER — SOCIAL WORK (OUTPATIENT)
Age: 30
End: 2025-06-11

## 2025-06-11 DIAGNOSIS — F33.1 MDD (MAJOR DEPRESSIVE DISORDER), RECURRENT EPISODE, MODERATE (HCC): Primary | ICD-10-CM

## 2025-06-11 DIAGNOSIS — F41.1 GAD (GENERALIZED ANXIETY DISORDER): ICD-10-CM

## 2025-06-11 PROCEDURE — MBD PR MOVING BEYOND DEPRESSION: Performed by: SOCIAL WORKER
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
## 2025-06-12 ENCOUNTER — PATIENT OUTREACH (OUTPATIENT)
Dept: OBGYN CLINIC | Facility: CLINIC | Age: 30
End: 2025-06-12

## 2025-06-12 NOTE — PROGRESS NOTES
MANUELITO SPRING f/u with [facility], [facility] worker today who is meeting with the family Monday 06/16 to complete the shelter questionnaire. MANUELITO SPRING will f/u in two weeks to see if the family was able to receive placement into safer housing.

## 2025-06-18 ENCOUNTER — SOCIAL WORK (OUTPATIENT)
Age: 30
End: 2025-06-18

## 2025-06-18 DIAGNOSIS — F41.1 GAD (GENERALIZED ANXIETY DISORDER): Primary | ICD-10-CM

## 2025-06-18 DIAGNOSIS — F33.1 MDD (MAJOR DEPRESSIVE DISORDER), RECURRENT EPISODE, MODERATE (HCC): ICD-10-CM

## 2025-06-18 PROCEDURE — MBD PR MOVING BEYOND DEPRESSION: Performed by: SOCIAL WORKER

## 2025-06-18 NOTE — PSYCH
"Behavioral Health Psychotherapy Progress Note    Psychotherapy Provided: Individual Psychotherapy     1. TADEO (generalized anxiety disorder)        2. MDD (major depressive disorder), recurrent episode, moderate (HCC)            Goals addressed in session: Goal 1, Goal 2, and Goal 3      DATA: This clinician met with Fatuma Sow for IH-CBT.  Session #10.  Completed the EPDS and she scored 6.    Homework Review: None completed    [place] Items: housing, family dynamics, job search    Sessions Content: Fatuma states that she has been getting along better with her mother.  They have been speaking to one another a little bit more recently.  She is still keeping a healthy distance from her but they are coexisting is a better way.  She continues to look for a job.  She has been unsuccessful.  She reports that she is applying for jobs \"all the time\" but she gets no  responses.  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  She looks forward to that.   Housing remains unchanged.  She states that she is waiting on the housing authority, conference of churches and the shelter to see if there is something available for her.  She does not seem too stress out by this situation.    Homework Assigned: Continue to reach out for help with housing and job search.    Feedback from Mother: She is feeling pretty good.  Baby makes her happy.    Plan: F/U 6/26 @ 1330  During this session, this clinician used the following therapeutic modalities: Cognitive Behavioral Therapy and Motivational Interviewing    Substance Abuse was not addressed during this session. If the client is diagnosed with a co-occurring substance use disorder, please indicate any changes in the frequency or amount of use: Denies. Stage of change for addressing substance use diagnoses: No substance use/Not applicable    ASSESSMENT:  Fatuma Sow presents with a Euthymic/ normal mood.     her affect is Normal range and intensity, " "which is congruent, with her mood and the content of the session. The client has not made progress on their goals.    Currently, Fatuma Sow presents with a none risk of suicide, none risk of self-harm, and none risk of harm to others.    For any risk assessment that surpasses a \"low\" rating, a safety plan must be developed.    A safety plan was indicated: no  If yes, describe in detail COMPLETED    PLAN: Between sessions, Fatuma Sow will work on housing and job search. At the next session, the therapist will use Cognitive Behavioral Therapy to address symptoms of depression and anxiety.    Behavioral Health Treatment Plan and Discharge Planning: Fatuma Sow is aware of and agrees to continue to work on their treatment plan. They have identified and are working toward their discharge goals. Yes    Depression Follow-up Plan Completed: Not applicable    Visit start and stop times:    06/18/25  Start Time: 1200  Stop Time: 1300  Total Visit Time: 60 minutes  "

## 2025-06-26 ENCOUNTER — SOCIAL WORK (OUTPATIENT)
Age: 30
End: 2025-06-26

## 2025-06-26 ENCOUNTER — PATIENT OUTREACH (OUTPATIENT)
Dept: OBGYN CLINIC | Facility: CLINIC | Age: 30
End: 2025-06-26

## 2025-06-26 DIAGNOSIS — F41.1 GAD (GENERALIZED ANXIETY DISORDER): ICD-10-CM

## 2025-06-26 DIAGNOSIS — F33.1 MDD (MAJOR DEPRESSIVE DISORDER), RECURRENT EPISODE, MODERATE (HCC): Primary | ICD-10-CM

## 2025-06-26 PROCEDURE — MBD PR MOVING BEYOND DEPRESSION: Performed by: SOCIAL WORKER

## 2025-06-26 NOTE — PROGRESS NOTES
MANUELITO SPRING called [facility] With  [facility] to check on the status of pts shelter referral, left VM.    MANUELITO SPRING called pt that she filed paperwork with the [place] [employer] and has to go in and file some paperwork, MANUELITO SPRING encouraged her to continue exploring all options (housing and transitional housing programs).     MANUELITO SPRING will f/u in two weeks.

## 2025-06-27 NOTE — PSYCH
"Behavioral Health Psychotherapy Progress Note    Psychotherapy Provided: Individual Psychotherapy     1. MDD (major depressive disorder), recurrent episode, moderate (HCC)        2. TADEO (generalized anxiety disorder)            Goals addressed in session: Goal 1, Goal 2, and Goal 3      DATA: This clinician met with Fatuma Geejimmiemarita for IH-CBT.  Session #11.  Completed the EPDS and she scored 7.    Homework Review: None completed    [place] Items: housing, job, relationship with mom    Sessions Content: Fatuma states that she has been feeling good about things because she got news that she is being \"chosen from the waitlist\" for housing.  She states that she got an email and she has to fill out some forms to get the process going.  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  This is a part-time job and it should be \"flexible\" for her schedule.  She and her mother have been communicating a little bit more.  She states that she feels better about things with her but she is also still looking forward to moving out of the apartment with her as soon as possible.    Homework Assigned: None assigned.    Feedback from Mother: She has been doing pretty well this past week.  Good news on job and housing.    Plan: F/U 7/7 @ 1330  During this session, this clinician used the following therapeutic modalities: Cognitive Behavioral Therapy    Substance Abuse was not addressed during this session. If the client is diagnosed with a co-occurring substance use disorder, please indicate any changes in the frequency or amount of use: N/A. Stage of change for addressing substance use diagnoses: No substance use/Not applicable    ASSESSMENT:  Fatuma Rosenbergmarita presents with a Euthymic/ normal mood.     her affect is Normal range and intensity, which is congruent, with her mood and the content of the session. The client has not made progress on their goals.    Currently, Fatuma ALAS" "Juanjose presents with a none risk of suicide, none risk of self-harm, and none risk of harm to others.    For any risk assessment that surpasses a \"low\" rating, a safety plan must be developed.    A safety plan was indicated: no  If yes, describe in detail COMPLETED    PLAN: Between sessions, Fatuma Sow will  work on identifying ways to structure her days. At the next session, the therapist will use Cognitive Behavioral Therapy to address symptoms of depression and anxiety.    Behavioral Health Treatment Plan and Discharge Planning: Fatuma Sow is aware of and agrees to continue to work on their treatment plan. They have identified and are working toward their discharge goals. yes    Depression Follow-up Plan Completed: Not applicable    Visit start and stop times:    06/27/25  Start Time: 1330  Stop Time: 1430  Total Visit Time: 60 minutes  "

## 2025-07-07 ENCOUNTER — SOCIAL WORK (OUTPATIENT)
Age: 30
End: 2025-07-07

## 2025-07-07 DIAGNOSIS — F41.1 GAD (GENERALIZED ANXIETY DISORDER): Primary | ICD-10-CM

## 2025-07-07 DIAGNOSIS — F33.1 MDD (MAJOR DEPRESSIVE DISORDER), RECURRENT EPISODE, MODERATE (HCC): ICD-10-CM

## 2025-07-07 PROCEDURE — MBD PR MOVING BEYOND DEPRESSION: Performed by: SOCIAL WORKER

## 2025-07-07 NOTE — PSYCH
"Behavioral Health Psychotherapy Progress Note    Psychotherapy Provided: Individual Psychotherapy     1. TADEO (generalized anxiety disorder)        2. MDD (major depressive disorder), recurrent episode, moderate (HCC)            Goals addressed in session: Goal 1, Goal 2, and Goal 3      DATA: This clinician met with Fatuma Sow for IH-CBT.  Session #12.  Completed the EPDS and she scored 3.    Homework Review: None completed    [place] Items: New job, relationship with Mom    Sessions Content: Met with Fatuma and she is doing well.  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  She is getting along okay with her mother most of the time. Her mood is very dependent of the relationship she has with her mother. She is trying not to let it affect her.  Denies SI and HI.  Spending time with her daughter is good for her mental health.    Homework Assigned:Continue to practice reframing negative thinking.    Feedback from Mother: She is having a good week.    Plan: F/U 7/14 @ 1300  During this session, this clinician used the following therapeutic modalities: Cognitive Behavioral Therapy    Substance Abuse was not addressed during this session. If the client is diagnosed with a co-occurring substance use disorder, please indicate any changes in the frequency or amount of use: Denies. Stage of change for addressing substance use diagnoses: No substance use/Not applicable  ASSESSMENT:  Fatuma Sow presents with a Euthymic/ normal mood.     her affect is Normal range and intensity, which is congruent, with her mood and the content of the session. The client has made progress on their goals.    Currently, Fatuma Sow presents with a none risk of suicide, none risk of self-harm, and none risk of harm to others.    For any risk assessment that surpasses a \"low\" rating, a safety plan must be developed.    A safety plan was indicated: no  If yes, describe " in detail COMPLETED    PLAN: Between sessions, Fatuma ALAS Coachman will work on reframing negative thinking. At the next session, the therapist will use Cognitive Behavioral Therapy and Motivational Interviewing to address symptoms of depression and anxiety.    Behavioral Health Treatment Plan and Discharge Planning: Fatuma ALAS Coachman is aware of and agrees to continue to work on their treatment plan. They have identified and are working toward their discharge goals. yes    Depression Follow-up Plan Completed: Not applicable    Visit start and stop times:    07/07/25  Start Time: 1330  Stop Time: 1430  Total Visit Time: 60 minutes

## 2025-07-10 ENCOUNTER — PATIENT OUTREACH (OUTPATIENT)
Dept: OBGYN CLINIC | Facility: CLINIC | Age: 30
End: 2025-07-10

## 2025-07-10 NOTE — PROGRESS NOTES
MANUELITO SPRING f/u with the pt today via phone, she reports she submitted applications for conference of [facility] program as well [facility] and is just waiting to hear back. Pt is starting a job on 07/15 as a  and can make her own hours. She is still working with [facility]. MANUELITO SPRING encouraged her to outreach as needed.

## 2025-07-14 ENCOUNTER — SOCIAL WORK (OUTPATIENT)
Age: 30
End: 2025-07-14

## 2025-07-14 DIAGNOSIS — F41.1 GAD (GENERALIZED ANXIETY DISORDER): ICD-10-CM

## 2025-07-14 DIAGNOSIS — F33.1 MDD (MAJOR DEPRESSIVE DISORDER), RECURRENT EPISODE, MODERATE (HCC): Primary | ICD-10-CM

## 2025-07-14 PROCEDURE — MBD PR MOVING BEYOND DEPRESSION: Performed by: SOCIAL WORKER

## 2025-07-14 NOTE — PSYCH
Behavioral Health Psychotherapy Progress Note    Psychotherapy Provided: Individual Psychotherapy     1. MDD (major depressive disorder), recurrent episode, moderate (HCC)        2. TADEO (generalized anxiety disorder)            Goals addressed in session: Goal 1, Goal 2, and Goal 3      DATA: This clinician met with Fatuma Sow for IH-CBT.  Session #13.  Completed the EPDS and she scored 5.    Homework Review:None     [place] Items: New job, family stressors, housing    Sessions Content: Fatuma states that she has been doing okay.  She is looking forward to starting her new job this week.  She states that she is still unsure about the details at this point but she is hoping that it will be a good fit for her.  She states that she has been getting along better with her mother.  She is trying to see things from her mother's perspective and give her more barry.  She states that she got another email from the housing authority and she realized that the previous email stated that she as placed on a waiting list not that she was going to receive housing soon like she had originally thought.    Homework Assigned: None    Feedback from Mother: She is looking forward to starting her job.    Plan: F/U 7/21 @ 1400  During this session, this clinician used the following therapeutic modalities: Cognitive Behavioral Therapy    Substance Abuse was not addressed during this session. If the client is diagnosed with a co-occurring substance use disorder, please indicate any changes in the frequency or amount of use: Denies Use. Stage of change for addressing substance use diagnoses: No substance use/Not applicable    ASSESSMENT:  Fatuma Sow presents with a Euthymic/ normal mood.     her affect is Normal range and intensity, which is congruent, with her mood and the content of the session. The client has made progress on their goals.    Currently, Fatuma Sow presents with a none risk of suicide, none risk of  "self-harm, and none risk of harm to others.    For any risk assessment that surpasses a \"low\" rating, a safety plan must be developed.    A safety plan was indicated: no  If yes, describe in detail COMPLETED    PLAN: Between sessions, Fatuma Geechman will work on identifying distorted thinking. At the next session, the therapist will use Cognitive Behavioral Therapy to address symptoms of depression and anxiety.    Behavioral Health Treatment Plan and Discharge Planning: Fatuma Geechmarita is aware of and agrees to continue to work on their treatment plan. They have identified and are working toward their discharge goals. yes    Depression Follow-up Plan Completed: Not applicable    Visit start and stop times:    07/14/25  Start Time: 1300  Stop Time: 1400  Total Visit Time: 60 minutes  "

## 2025-08-05 ENCOUNTER — SOCIAL WORK (OUTPATIENT)
Age: 30
End: 2025-08-05

## 2025-08-05 DIAGNOSIS — F33.1 MDD (MAJOR DEPRESSIVE DISORDER), RECURRENT EPISODE, MODERATE (HCC): ICD-10-CM

## 2025-08-05 DIAGNOSIS — F41.1 GAD (GENERALIZED ANXIETY DISORDER): Primary | ICD-10-CM

## 2025-08-05 PROCEDURE — MBD PR MOVING BEYOND DEPRESSION: Performed by: SOCIAL WORKER

## 2025-08-12 ENCOUNTER — SOCIAL WORK (OUTPATIENT)
Age: 30
End: 2025-08-12